# Patient Record
Sex: FEMALE | Race: OTHER | HISPANIC OR LATINO
[De-identification: names, ages, dates, MRNs, and addresses within clinical notes are randomized per-mention and may not be internally consistent; named-entity substitution may affect disease eponyms.]

---

## 2018-07-05 ENCOUNTER — APPOINTMENT (OUTPATIENT)
Dept: NEUROLOGY | Facility: CLINIC | Age: 43
End: 2018-07-05
Payer: COMMERCIAL

## 2018-07-05 VITALS
HEIGHT: 61 IN | HEART RATE: 66 BPM | SYSTOLIC BLOOD PRESSURE: 124 MMHG | WEIGHT: 138 LBS | BODY MASS INDEX: 26.06 KG/M2 | DIASTOLIC BLOOD PRESSURE: 70 MMHG

## 2018-07-05 DIAGNOSIS — M54.2 CERVICALGIA: ICD-10-CM

## 2018-07-05 DIAGNOSIS — Z82.49 FAMILY HISTORY OF ISCHEMIC HEART DISEASE AND OTHER DISEASES OF THE CIRCULATORY SYSTEM: ICD-10-CM

## 2018-07-05 DIAGNOSIS — R20.2 PARESTHESIA OF SKIN: ICD-10-CM

## 2018-07-05 DIAGNOSIS — R51 HEADACHE: ICD-10-CM

## 2018-07-05 PROCEDURE — 99204 OFFICE O/P NEW MOD 45 MIN: CPT

## 2020-09-03 ENCOUNTER — APPOINTMENT (OUTPATIENT)
Dept: OBGYN | Facility: CLINIC | Age: 45
End: 2020-09-03
Payer: COMMERCIAL

## 2020-09-03 VITALS
SYSTOLIC BLOOD PRESSURE: 147 MMHG | BODY MASS INDEX: 27.94 KG/M2 | DIASTOLIC BLOOD PRESSURE: 81 MMHG | WEIGHT: 148 LBS | HEIGHT: 61 IN

## 2020-09-03 DIAGNOSIS — N92.6 IRREGULAR MENSTRUATION, UNSPECIFIED: ICD-10-CM

## 2020-09-03 PROCEDURE — 99386 PREV VISIT NEW AGE 40-64: CPT

## 2020-09-03 PROCEDURE — 99213 OFFICE O/P EST LOW 20 MIN: CPT | Mod: 25

## 2020-09-03 NOTE — HISTORY OF PRESENT ILLNESS
[1 Year Ago] : 1 year ago [Good] : being in good health [Healthy Diet] : a healthy diet [Regular Exercise] : regular exercise [Weight Concerns] : no concerns with her weight [Last Pap ___] : Last cervical pap smear was [unfilled] [Reproductive Age] : is of reproductive age [Menstrual Problems] : reports abnormal menses [Excessive Bleeding] : there was excessive bleeding [Irregular Duration] : has been irregular [Contraception] : does not use contraception [Pregnancy History] : denies prior pregnancies [Prolonged Menses] : prolonged menses [Localized Pain] : localized breast pain [Fibroids] : fibroids [Lt Axilla] : left axilla [Normal Amount/Duration] : was of a normal amount and duration [Spotting Between  Menses] : no spotting between menses [Regular Cycle Intervals] : periods have been regular [Prior Menses:  ___ Date] : date of prior menstruation was [unfilled]

## 2020-09-08 LAB — HPV HIGH+LOW RISK DNA PNL CVX: NOT DETECTED

## 2020-09-10 LAB — CYTOLOGY CVX/VAG DOC THIN PREP: NORMAL

## 2020-09-11 ENCOUNTER — RESULT REVIEW (OUTPATIENT)
Age: 45
End: 2020-09-11

## 2020-09-11 ENCOUNTER — APPOINTMENT (OUTPATIENT)
Dept: ULTRASOUND IMAGING | Facility: CLINIC | Age: 45
End: 2020-09-11
Payer: COMMERCIAL

## 2020-09-11 PROCEDURE — 76830 TRANSVAGINAL US NON-OB: CPT

## 2020-10-05 ENCOUNTER — APPOINTMENT (OUTPATIENT)
Dept: ULTRASOUND IMAGING | Facility: CLINIC | Age: 45
End: 2020-10-05

## 2020-10-05 ENCOUNTER — APPOINTMENT (OUTPATIENT)
Dept: MAMMOGRAPHY | Facility: CLINIC | Age: 45
End: 2020-10-05

## 2020-10-13 ENCOUNTER — APPOINTMENT (OUTPATIENT)
Dept: MAMMOGRAPHY | Facility: HOSPITAL | Age: 45
End: 2020-10-13

## 2020-10-13 ENCOUNTER — APPOINTMENT (OUTPATIENT)
Dept: ULTRASOUND IMAGING | Facility: HOSPITAL | Age: 45
End: 2020-10-13

## 2020-10-14 ENCOUNTER — APPOINTMENT (OUTPATIENT)
Dept: SURGERY | Facility: CLINIC | Age: 45
End: 2020-10-14
Payer: COMMERCIAL

## 2020-10-22 ENCOUNTER — APPOINTMENT (OUTPATIENT)
Dept: SURGERY | Facility: CLINIC | Age: 45
End: 2020-10-22
Payer: COMMERCIAL

## 2020-10-22 VITALS
RESPIRATION RATE: 16 BRPM | WEIGHT: 150 LBS | BODY MASS INDEX: 27.6 KG/M2 | DIASTOLIC BLOOD PRESSURE: 80 MMHG | TEMPERATURE: 98 F | SYSTOLIC BLOOD PRESSURE: 147 MMHG | HEIGHT: 62 IN | OXYGEN SATURATION: 98 % | HEART RATE: 65 BPM

## 2020-10-22 DIAGNOSIS — M79.89 OTHER SPECIFIED SOFT TISSUE DISORDERS: ICD-10-CM

## 2020-10-22 DIAGNOSIS — Z90.49 ACQUIRED ABSENCE OF OTHER SPECIFIED PARTS OF DIGESTIVE TRACT: ICD-10-CM

## 2020-10-22 PROCEDURE — 99244 OFF/OP CNSLTJ NEW/EST MOD 40: CPT

## 2020-10-22 RX ORDER — METOPROLOL SUCCINATE 50 MG/1
50 TABLET, EXTENDED RELEASE ORAL
Refills: 0 | Status: ACTIVE | COMMUNITY

## 2020-10-22 RX ORDER — FEXOFENADINE HYDROCHLORIDE 180 MG/1
180 TABLET, FILM COATED ORAL DAILY
Refills: 0 | Status: DISCONTINUED | COMMUNITY
Start: 2018-07-05 | End: 2020-10-22

## 2020-10-22 NOTE — ASSESSMENT
[FreeTextEntry1] : I told the patient that she has some excess fat tissue in this area which does not feel like a lipoma the discomfort she feels may be that there is the tail of the breast up is in this area and she may have some mild chronic cystic mastopathy.  I have asked her to stop drinking any excess amount of caffeinated beverages or chocolate.\par \par I have put her on meloxicam for 2 weeks and she will call me to see if there is been any change.

## 2020-10-22 NOTE — CONSULT LETTER
[Dear  ___] : Dear  [unfilled], [Consult Letter:] : I had the pleasure of evaluating your patient, [unfilled]. [Please see my note below.] : Please see my note below. [Consult Closing:] : Thank you very much for allowing me to participate in the care of this patient.  If you have any questions, please do not hesitate to contact me. [Sincerely,] : Sincerely, [FreeTextEntry3] : I have reviewed all the documentation for this encounter with the patient and have edited where appropriate\par \par Dr. Alber Springer

## 2020-10-22 NOTE — HISTORY OF PRESENT ILLNESS
[de-identified] : Yoselin is a 46 y/o female here for consultation visit. The patient reports feeling "swelling" in the left axilla for the past 2 months. This has not been increasing in size. She denies severe pain. Recently noted some discomfort. She notes a decrease in swelling when drinking increased fluids. She has a hx of kidney stones.  The patient had a mammogram which was normal and axillary ultrasounds along with ultrasounds of the breast.  The ultrasound showed normal axillary lymph nodes bilaterally.  I have reviewed the images of the ultrasound and the mammogram.

## 2020-10-22 NOTE — PHYSICAL EXAM
[Normal Thyroid] : the thyroid was normal [Normal Breath Sounds] : Normal breath sounds [Normal Heart Sounds] : normal heart sounds [Normal Rate and Rhythm] : normal rate and rhythm [No Rash or Lesion] : No rash or lesion [Alert] : alert [Oriented to Person] : oriented to person [Oriented to Place] : oriented to place [Oriented to Time] : oriented to time [Anxious] : anxious [JVD] : no jugular venous distention  [Abdominal Masses] : No abdominal masses [Abdomen Tenderness] : ~T ~M No abdominal tenderness [de-identified] : Appears well nourished, in no acute distress [de-identified] : WNL [de-identified] : Lamination of both axilla failed to reveal any adenopathy.  With the patient's arms extended overhead there is definitely some swelling along the anterior axillary line on the left side compared to the right this area is soft and without any definite palpable abnormality. [de-identified] : WNL

## 2021-04-12 ENCOUNTER — EMERGENCY (EMERGENCY)
Facility: HOSPITAL | Age: 46
LOS: 1 days | Discharge: HOME | End: 2021-04-12
Attending: STUDENT IN AN ORGANIZED HEALTH CARE EDUCATION/TRAINING PROGRAM | Admitting: STUDENT IN AN ORGANIZED HEALTH CARE EDUCATION/TRAINING PROGRAM
Payer: COMMERCIAL

## 2021-04-12 VITALS
DIASTOLIC BLOOD PRESSURE: 70 MMHG | TEMPERATURE: 98 F | OXYGEN SATURATION: 98 % | SYSTOLIC BLOOD PRESSURE: 125 MMHG | RESPIRATION RATE: 16 BRPM | HEART RATE: 69 BPM

## 2021-04-12 VITALS
DIASTOLIC BLOOD PRESSURE: 51 MMHG | TEMPERATURE: 98 F | SYSTOLIC BLOOD PRESSURE: 104 MMHG | HEART RATE: 66 BPM | RESPIRATION RATE: 15 BRPM | OXYGEN SATURATION: 99 %

## 2021-04-12 DIAGNOSIS — R10.9 UNSPECIFIED ABDOMINAL PAIN: ICD-10-CM

## 2021-04-12 LAB
ALBUMIN SERPL ELPH-MCNC: 3.9 G/DL — SIGNIFICANT CHANGE UP (ref 3.3–5)
ALP SERPL-CCNC: 77 U/L — SIGNIFICANT CHANGE UP (ref 40–120)
ALT FLD-CCNC: 57 U/L — HIGH (ref 4–33)
ANION GAP SERPL CALC-SCNC: 11 MMOL/L — SIGNIFICANT CHANGE UP (ref 7–14)
APPEARANCE UR: ABNORMAL
AST SERPL-CCNC: 30 U/L — SIGNIFICANT CHANGE UP (ref 4–32)
BACTERIA # UR AUTO: ABNORMAL
BASOPHILS # BLD AUTO: 0.05 K/UL — SIGNIFICANT CHANGE UP (ref 0–0.2)
BASOPHILS NFR BLD AUTO: 0.6 % — SIGNIFICANT CHANGE UP (ref 0–2)
BILIRUB SERPL-MCNC: 0.3 MG/DL — SIGNIFICANT CHANGE UP (ref 0.2–1.2)
BILIRUB UR-MCNC: NEGATIVE — SIGNIFICANT CHANGE UP
BUN SERPL-MCNC: 11 MG/DL — SIGNIFICANT CHANGE UP (ref 7–23)
CALCIUM SERPL-MCNC: 8.8 MG/DL — SIGNIFICANT CHANGE UP (ref 8.4–10.5)
CHLORIDE SERPL-SCNC: 103 MMOL/L — SIGNIFICANT CHANGE UP (ref 98–107)
CO2 SERPL-SCNC: 22 MMOL/L — SIGNIFICANT CHANGE UP (ref 22–31)
COLOR SPEC: YELLOW — SIGNIFICANT CHANGE UP
CREAT SERPL-MCNC: 0.74 MG/DL — SIGNIFICANT CHANGE UP (ref 0.5–1.3)
DIFF PNL FLD: NEGATIVE — SIGNIFICANT CHANGE UP
EOSINOPHIL # BLD AUTO: 0.53 K/UL — HIGH (ref 0–0.5)
EOSINOPHIL NFR BLD AUTO: 6.4 % — HIGH (ref 0–6)
EPI CELLS # UR: 12 /HPF — HIGH (ref 0–5)
GLUCOSE SERPL-MCNC: 103 MG/DL — HIGH (ref 70–99)
GLUCOSE UR QL: NEGATIVE — SIGNIFICANT CHANGE UP
HCG SERPL-ACNC: <5 MIU/ML — SIGNIFICANT CHANGE UP
HCT VFR BLD CALC: 37.6 % — SIGNIFICANT CHANGE UP (ref 34.5–45)
HGB BLD-MCNC: 12.5 G/DL — SIGNIFICANT CHANGE UP (ref 11.5–15.5)
HYALINE CASTS # UR AUTO: 1 /LPF — SIGNIFICANT CHANGE UP (ref 0–7)
IANC: 4.33 K/UL — SIGNIFICANT CHANGE UP (ref 1.5–8.5)
IMM GRANULOCYTES NFR BLD AUTO: 0.4 % — SIGNIFICANT CHANGE UP (ref 0–1.5)
KETONES UR-MCNC: NEGATIVE — SIGNIFICANT CHANGE UP
LEUKOCYTE ESTERASE UR-ACNC: NEGATIVE — SIGNIFICANT CHANGE UP
LYMPHOCYTES # BLD AUTO: 2.71 K/UL — SIGNIFICANT CHANGE UP (ref 1–3.3)
LYMPHOCYTES # BLD AUTO: 32.8 % — SIGNIFICANT CHANGE UP (ref 13–44)
MCHC RBC-ENTMCNC: 29.7 PG — SIGNIFICANT CHANGE UP (ref 27–34)
MCHC RBC-ENTMCNC: 33.2 GM/DL — SIGNIFICANT CHANGE UP (ref 32–36)
MCV RBC AUTO: 89.3 FL — SIGNIFICANT CHANGE UP (ref 80–100)
MONOCYTES # BLD AUTO: 0.61 K/UL — SIGNIFICANT CHANGE UP (ref 0–0.9)
MONOCYTES NFR BLD AUTO: 7.4 % — SIGNIFICANT CHANGE UP (ref 2–14)
NEUTROPHILS # BLD AUTO: 4.33 K/UL — SIGNIFICANT CHANGE UP (ref 1.8–7.4)
NEUTROPHILS NFR BLD AUTO: 52.4 % — SIGNIFICANT CHANGE UP (ref 43–77)
NITRITE UR-MCNC: NEGATIVE — SIGNIFICANT CHANGE UP
NRBC # BLD: 0 /100 WBCS — SIGNIFICANT CHANGE UP
NRBC # FLD: 0 K/UL — SIGNIFICANT CHANGE UP
PH UR: 6 — SIGNIFICANT CHANGE UP (ref 5–8)
PLATELET # BLD AUTO: 169 K/UL — SIGNIFICANT CHANGE UP (ref 150–400)
POTASSIUM SERPL-MCNC: 3.8 MMOL/L — SIGNIFICANT CHANGE UP (ref 3.5–5.3)
POTASSIUM SERPL-SCNC: 3.8 MMOL/L — SIGNIFICANT CHANGE UP (ref 3.5–5.3)
PROT SERPL-MCNC: 7 G/DL — SIGNIFICANT CHANGE UP (ref 6–8.3)
PROT UR-MCNC: ABNORMAL
RBC # BLD: 4.21 M/UL — SIGNIFICANT CHANGE UP (ref 3.8–5.2)
RBC # FLD: 12.2 % — SIGNIFICANT CHANGE UP (ref 10.3–14.5)
RBC CASTS # UR COMP ASSIST: 4 /HPF — SIGNIFICANT CHANGE UP (ref 0–4)
SARS-COV-2 RNA SPEC QL NAA+PROBE: SIGNIFICANT CHANGE UP
SODIUM SERPL-SCNC: 136 MMOL/L — SIGNIFICANT CHANGE UP (ref 135–145)
SP GR SPEC: 1.03 — HIGH (ref 1.01–1.02)
UROBILINOGEN FLD QL: SIGNIFICANT CHANGE UP
WBC # BLD: 8.26 K/UL — SIGNIFICANT CHANGE UP (ref 3.8–10.5)
WBC # FLD AUTO: 8.26 K/UL — SIGNIFICANT CHANGE UP (ref 3.8–10.5)
WBC UR QL: 2 /HPF — SIGNIFICANT CHANGE UP (ref 0–5)

## 2021-04-12 PROCEDURE — 76830 TRANSVAGINAL US NON-OB: CPT | Mod: 26

## 2021-04-12 PROCEDURE — 99285 EMERGENCY DEPT VISIT HI MDM: CPT

## 2021-04-12 PROCEDURE — 76770 US EXAM ABDO BACK WALL COMP: CPT | Mod: 26

## 2021-04-12 PROCEDURE — 74176 CT ABD & PELVIS W/O CONTRAST: CPT | Mod: 26

## 2021-04-12 RX ORDER — KETOROLAC TROMETHAMINE 30 MG/ML
15 SYRINGE (ML) INJECTION ONCE
Refills: 0 | Status: DISCONTINUED | OUTPATIENT
Start: 2021-04-12 | End: 2021-04-12

## 2021-04-12 RX ORDER — IBUPROFEN 200 MG
1 TABLET ORAL
Qty: 21 | Refills: 0
Start: 2021-04-12 | End: 2021-04-18

## 2021-04-12 RX ORDER — ONDANSETRON 8 MG/1
4 TABLET, FILM COATED ORAL ONCE
Refills: 0 | Status: COMPLETED | OUTPATIENT
Start: 2021-04-12 | End: 2021-04-12

## 2021-04-12 RX ORDER — LIDOCAINE 4 G/100G
1 CREAM TOPICAL
Qty: 30 | Refills: 0
Start: 2021-04-12 | End: 2021-04-18

## 2021-04-12 RX ORDER — SODIUM CHLORIDE 9 MG/ML
1000 INJECTION INTRAMUSCULAR; INTRAVENOUS; SUBCUTANEOUS ONCE
Refills: 0 | Status: COMPLETED | OUTPATIENT
Start: 2021-04-12 | End: 2021-04-12

## 2021-04-12 RX ORDER — KETOROLAC TROMETHAMINE 30 MG/ML
30 SYRINGE (ML) INJECTION ONCE
Refills: 0 | Status: DISCONTINUED | OUTPATIENT
Start: 2021-04-12 | End: 2021-04-12

## 2021-04-12 RX ADMIN — Medication 15 MILLIGRAM(S): at 06:28

## 2021-04-12 RX ADMIN — SODIUM CHLORIDE 1000 MILLILITER(S): 9 INJECTION INTRAMUSCULAR; INTRAVENOUS; SUBCUTANEOUS at 06:28

## 2021-04-12 RX ADMIN — ONDANSETRON 4 MILLIGRAM(S): 8 TABLET, FILM COATED ORAL at 06:28

## 2021-04-12 RX ADMIN — Medication 30 MILLIGRAM(S): at 11:34

## 2021-04-12 NOTE — CONSULT NOTE ADULT - ASSESSMENT
44yo  w/ PMH nephrolithiasis and HTN evaluated for L sided flank pain x4 days. Physical exam significant for left lower back tenderness, but no CVA tenderness, and gyn exam benign. Imaging significant for right hydrosalpinx, but no left sided abnormalities. Patient stable.

## 2021-04-12 NOTE — CONSULT NOTE ADULT - SUBJECTIVE AND OBJECTIVE BOX
AARON BERG  45y  Female [id number]    HPI: 44yo w/ PMH nephrolithiasis presents w/ L sided flank pain x1 week. She notes that the pain is intermittent, does not radiate to the left side, and is rated 7/10 in severity. She endorses nausea, but no vomiting. Denies fevers, chills, chest pain, SOB, abdominal pain, vaginal discharge, vaginal bleeding, dysuria, frequency, urgency, or bowel complaints.     Name of GYN Physician:     POB:     Pgyn: Denies fibroids, cysts, endometriosis, STI's, Abnormal pap smears     Home meds:     Hospital Meds:   MEDICATIONS  (STANDING):    MEDICATIONS  (PRN):      Allergies    No Known Allergies    Intolerances        PAST MEDICAL & SURGICAL HISTORY:  No pertinent past medical history    No significant past surgical history        FAMILY HISTORY:  No pertinent family history in first degree relatives        Social History:  Denies smoking use, drug use, alcohol use.   +occasional social alcohol use    Vital Signs Last 24 Hrs  T(C): 36.7 (2021 11:46), Max: 36.8 (2021 05:08)  T(F): 98.1 (2021 11:46), Max: 98.2 (2021 05:08)  HR: 66 (2021 11:46) (66 - 74)  BP: 104/51 (2021 11:46) (104/51 - 131/78)  BP(mean): --  RR: 15 (2021 11:46) (15 - 17)  SpO2: 99% (2021 11:46) (98% - 99%)    Physical Exam:   General: sitting comftorably in bed, NAD   HEENT: neck supple, full ROM  CV: RR S1S2 no m/r/g  Lungs: CTA b/l, good air flow b/l   Back: No CVA tenderness  Abd: Soft, non-tender, non-distended.  Bowel sounds present.    :  No bleeding on pad.    External labia wnl.  Bimanual exam with no cervical motion tenderness, uterus wnl, adnexa non palpable b/l.  Cervix closed vs. Cervix dilated    cm   Speculum Exam: No active bleeding from os.  Physiologic discharge.    Ext: non-tender b/l, no edema     LABS:                              12.5   8.26  )-----------( 169      ( 2021 06:38 )             37.6     04-12    136  |  103  |  11  ----------------------------<  103<H>  3.8   |  22  |  0.74    Ca    8.8      2021 06:38    TPro  7.0  /  Alb  3.9  /  TBili  0.3  /  DBili  x   /  AST  30  /  ALT  57<H>  /  AlkPhos  77  04-12    I&O's Detail      Urinalysis Basic - ( 2021 06:38 )    Color: Yellow / Appearance: Slightly Turbid / S.033 / pH: x  Gluc: x / Ketone: Negative  / Bili: Negative / Urobili: <2 mg/dL   Blood: x / Protein: 30 mg/dL / Nitrite: Negative   Leuk Esterase: Negative / RBC: 4 /HPF / WBC 2 /HPF   Sq Epi: x / Non Sq Epi: 12 /HPF / Bacteria: Few        RADIOLOGY & ADDITIONAL STUDIES: AARON BERG  45y  Female [id number]    HPI: 46yo  w/ PMH nephrolithiasis and HTN presents w/ L sided flank pain x4 days. She notes that the pain is intermittent, does not radiate to the left side, and is rated 7/10 in severity. She took Aleve at home, which provided temporary relief. She saw her PCP at the onset of symptoms, and was given flomax and cipro, but states that her symptoms have worsened despite these meds. She endorses nausea, but no vomiting. Endorses slightly dark urine recently, but denies hematuria, malodor, urinary urgency, urinary frequency, or incontinence. Denies fevers, chills, chest pain, SOB, abdominal pain, vaginal discharge, vaginal bleeding, or bowel complaints.     Name of GYN Physician: sees a provider in [place], but patient could not recall the name or office location of the provider    POB: ETOP s/p D&C in     Pgyn: Denies fibroids, cysts, endometriosis, STI's, Abnormal pap smears       Allergies  No Known Allergies    MEDICAL & SURGICAL HISTORY:  HTN   Nephrolithiasis  s/p D&C    FAMILY HISTORY:  No pertinent family history in first degree relatives    Social History:  Denies smoking use, drug use, alcohol use.   +occasional social alcohol use    Vital Signs Last 24 Hrs  T(C): 36.7 (2021 11:46), Max: 36.8 (2021 05:08)  T(F): 98.1 (2021 11:46), Max: 98.2 (2021 05:08)  HR: 66 (2021 11:46) (66 - 74)  BP: 104/51 (2021 11:46) (104/51 - 131/78)  BP(mean): --  RR: 15 (2021 11:46) (15 - 17)  SpO2: 99% (2021 11:46) (98% - 99%)    Physical Exam:   General: sitting comfortably in bed, NAD   CV: RR S1S2 no m/r/g  Lungs: CTA b/l, good air flow b/l   Back: tender to palpation along left lower back at the level of the sacrum, No CVA tenderness bilaterally  Abd: Soft, non-tender, non-distended.  Bowel sounds present. No rebound or guarding.  :  No bleeding on pad.  External labia wnl.  Bimanual exam with no cervical motion tenderness, uterus wnl, adnexa non palpable b/l.  Cervix closed  Speculum Exam: No active bleeding from os.  Physiologic discharge.    Ext: non-tender b/l, no edema     LABS:               12.5   8.26  )-----------( 169      ( 2021 06:38 )             37.6         136  |  103  |  11  ----------------------------<  103<H>  3.8   |  22  |  0.74    Ca    8.8      2021 06:38    TPro  7.0  /  Alb  3.9  /  TBili  0.3  /  DBili  x   /  AST  30  /  ALT  57<H>  /  AlkPhos  77      I&O's Detail    Urinalysis Basic - ( 2021 06:38 )    Color: Yellow / Appearance: Slightly Turbid / S.033 / pH: x  Gluc: x / Ketone: Negative  / Bili: Negative / Urobili: <2 mg/dL   Blood: x / Protein: 30 mg/dL / Nitrite: Negative   Leuk Esterase: Negative / RBC: 4 /HPF / WBC 2 /HPF   Sq Epi: x / Non Sq Epi: 12 /HPF / Bacteria: Few    RADIOLOGY & ADDITIONAL STUDIES:    TVUS FINDINGS (21):  Uterus: 10.6 x 6.3 x 6.6 cm. Multiple fibroids, the largest is posterior intramural fibroid measuring 5.9 x 5.1 x 4.8 cm, not significantly changed.  Endometrium: 8 mm. Mild cystic changes.  Right ovary: Not well visualized. Two cystic structures in the right adnexa measuring 2.0 x 2.0 x 2.1 cm and a more tubular structure measuring 5.5 x 2.1 x 2.0 cm.  Left ovary: 1.8 x 1.4 x 1.4 cm. Within normal limits.  Fluid: None.    IMPRESSION:  Right ovary not well visualized. Two cystic structures in the right adnexa, likely related to hydrosalpinx/ paraovarian cysts.  Prominent endometrium with cystic change. An underlying polyp cannot be excluded. If clinically warranted, a sonohysterogram can be performed for further evaluation.  Fibroid uterus.

## 2021-04-12 NOTE — ED ADULT NURSE REASSESSMENT NOTE - NS ED NURSE REASSESS COMMENT FT1
Pt awaiting ultrasound; resting comfortably at this time.
Pt states pain is coming back 6/10 after returning from u/s.  LUIZ galloway; awaiting MD orders.
Pt reports pain diminished 3-4/10 and tolerable. Awaiting further Md orders.

## 2021-04-12 NOTE — ED PROVIDER NOTE - PROGRESS NOTE DETAILS
Ad: Received sign out from resident. PT sleeping comfortably before assessment. Symptoms improved with medications. Pending labs and CT. Ad: CT shows 6cm cystic structrure on the R - will get tVUS to evaluate. Pt still comfortable Ad: US shows cystic structures cannot exclude hydrosalpinx on the R. on reexamination after 2nd dose of toradol patient still has CVA tenderness b/l with neg UA and CT abd/pel. GYN paged for consult. Ad: GYN called - will discuss case with seniors. Ad: GYN ealuated. States the CVA tenderness does not apepar to be GYN in origin. Will dc with pain medication and pmd followup

## 2021-04-12 NOTE — ED PROVIDER NOTE - OBJECTIVE STATEMENT
45 female, Hx: HTN, Kidney Stone - presents with L sided flank pain (atraumatic in presentation) that began on Friday. Pt reports pain is localized to L flank radiating down, with associated nausea. Denies any fevers, chills, cough, CP, SOB, abdominal pain, vomiting, constipation, bloody stools, dysuric symptoms. Reports she saw her PMD - had a UA (does not know results) and was empirically started on Cipro and Flomax. Now with worsening flank pain. Reports pain is exact same in presentation as her previous stones. Denies pregnancy.

## 2021-04-12 NOTE — ED ADULT NURSE NOTE - CHIEF COMPLAINT QUOTE
Patient having left flank pain since friday. patient was prescribed flomax and cipro 500. patient continues to have some pain to left flank

## 2021-04-12 NOTE — CONSULT NOTE ADULT - PROBLEM SELECTOR RECOMMENDATION 9
Plan  - Clinical presentation (left flank pain) inconsistent with imaging (right hydrosalpinx), GYN etiology unlikely   - Patient afebrile, WBC wnl  - Patient hemodynamically stable     Patient H&P presented to Dr. Goodson, attending physician. Attempted to re-evaluate patient with Dr. Goodson at bedside, however, upon arrival to the ED, nurse informed that patient had been discharged, as her pain had improved.     D/w Dr. Hamzah Yang, PGY1

## 2021-04-12 NOTE — ED ADULT NURSE NOTE - OBJECTIVE STATEMENT
Patient received in room 27, A&OX4, ambulatory. Patient reports to ED complaining of left flank pain/nausea since Friday. Patient reports seeing PCP and being prescribed Cipro and Flomax without relief. Patient denies urinary symptoms, fevers, chills, vomiting, diarrhea. 20g placed in right ac, labs collected and sent. urine collected and sent. Patient in no acute distress, respirations even and unlabored. Will continue to monitor.

## 2021-04-12 NOTE — ED PROVIDER NOTE - PATIENT PORTAL LINK FT
You can access the FollowMyHealth Patient Portal offered by [facility] by registering at the following website: [email or web address]. By joining [employer]’s FollowMyHealth portal, you will also be able to view your health information using other applications (apps) compatible with our system.

## 2021-04-12 NOTE — ED PROVIDER NOTE - PHYSICAL EXAMINATION
GEN - NAD; non-toxic; A+Ox3, speaking full sentences, steady gait   HENT - NC/AT, No visible Ecchymosis, No Abrasions, No Lac/Tears, MMM, no discharge  EYES - EOMI, no conjunctival pallor, no scleral icterus  PULM - CTA B/L,  symmetric breath sounds  CV -  RRR, S1 S2, no murmurs 2+ Pulses B/L UE  GI - NT/ND, soft, no guarding, no rebound, no masses    MSK/EXT- (+) L CVA tenderness; no edema, no gross deformity, warm and well perfused, no calf tenderness/swelling/erythema   SKIN - no rash or bruising  NEUROLOGIC - alert, moving all 4 ext with 5/5 Strength

## 2021-04-12 NOTE — ED PROVIDER NOTE - NS ED ROS FT
Constitution: No Fever or chills, No Weight Loss,   Eyes: No visual changes  HEENT: No cough, No Discharge, No Rhinorrhea, No URI symptoms  Cardio: No Chest pain, No Palpitations, No Dyspnea  Resp: No SOB, No Wheezing  GI: No abdominal pain, (+) Nausea, No Vomiting, No Constipation, No Diarrhea  : (+) L Flank Pain; No burning upon urination, trouble urinating, no foul odor from urine  MSK: No Numbness, No Tingling, No Weakness  Neuro: No Headache, Normal Gait  Skin: No rashes, No Bruising, No Swelling

## 2021-04-12 NOTE — ED PROVIDER NOTE - NSFOLLOWUPINSTRUCTIONS_ED_ALL_ED_FT
Take Ibuprofen 600mg every 8 hours as needed for pain.   Use lidocaine patch for up to 12 hours as needed for pain.   Follow up with your primary care if symptomatic persist.     SEEK IMMEDIATE MEDICAL CARE IF YOU HAVE ANY OF THE FOLLOWING SYMPTOMS: severe back or abdominal pain, fever, inability to keep fluids or medicine down, dizziness/lightheadedness, or a change in mental status.

## 2021-04-12 NOTE — ED PROVIDER NOTE - CLINICAL SUMMARY MEDICAL DECISION MAKING FREE TEXT BOX
45 female, Hx: HTN, Kidney Stone - presents with L sided flank pain (atraumatic in presentation) that began on Friday. with associated nausea. Saw PMD - had a UA (does not know results); empirically started on Cipro and Flomax. Exam, presentation, and history concerning for renal colic vs. pyelo; evaluation is not consistent with appendicitis, cholecystitis, pancreatitis, diverticulitis, AAA. Plan: CBC, CMP, UA, UCx, HCG, Fluids, Zofran, Toradol, Re-eval. Renal Sono. VSS, non-toxic.

## 2021-04-13 LAB
CULTURE RESULTS: SIGNIFICANT CHANGE UP
SPECIMEN SOURCE: SIGNIFICANT CHANGE UP

## 2021-08-10 NOTE — ED ADULT NURSE NOTE - CAS DISCH TRANSFER METHOD
You can access the FollowMyHealth Patient Portal offered by [facility] by registering at the following website: [email or web address]. By joining [employer]’s FollowMyHealth portal, you will also be able to view your health information using other applications (apps) compatible with our system.
Private car

## 2021-10-01 ENCOUNTER — EMERGENCY (EMERGENCY)
Facility: HOSPITAL | Age: 46
LOS: 1 days | Discharge: HOME | End: 2021-10-01
Attending: STUDENT IN AN ORGANIZED HEALTH CARE EDUCATION/TRAINING PROGRAM | Admitting: STUDENT IN AN ORGANIZED HEALTH CARE EDUCATION/TRAINING PROGRAM
Payer: COMMERCIAL

## 2021-10-01 VITALS
RESPIRATION RATE: 16 BRPM | DIASTOLIC BLOOD PRESSURE: 81 MMHG | OXYGEN SATURATION: 100 % | HEART RATE: 68 BPM | SYSTOLIC BLOOD PRESSURE: 123 MMHG

## 2021-10-01 VITALS
RESPIRATION RATE: 16 BRPM | DIASTOLIC BLOOD PRESSURE: 70 MMHG | SYSTOLIC BLOOD PRESSURE: 123 MMHG | HEART RATE: 64 BPM | OXYGEN SATURATION: 100 % | TEMPERATURE: 99 F

## 2021-10-01 LAB
ALBUMIN SERPL ELPH-MCNC: 4.1 G/DL — SIGNIFICANT CHANGE UP (ref 3.3–5)
ALP SERPL-CCNC: 76 U/L — SIGNIFICANT CHANGE UP (ref 40–120)
ALT FLD-CCNC: 48 U/L — HIGH (ref 4–33)
ANION GAP SERPL CALC-SCNC: 9 MMOL/L — SIGNIFICANT CHANGE UP (ref 7–14)
APPEARANCE UR: CLEAR — SIGNIFICANT CHANGE UP
AST SERPL-CCNC: 27 U/L — SIGNIFICANT CHANGE UP (ref 4–32)
BASOPHILS # BLD AUTO: 0.07 K/UL — SIGNIFICANT CHANGE UP (ref 0–0.2)
BASOPHILS NFR BLD AUTO: 0.5 % — SIGNIFICANT CHANGE UP (ref 0–2)
BILIRUB SERPL-MCNC: 0.3 MG/DL — SIGNIFICANT CHANGE UP (ref 0.2–1.2)
BILIRUB UR-MCNC: NEGATIVE — SIGNIFICANT CHANGE UP
BLOOD GAS VENOUS COMPREHENSIVE RESULT: SIGNIFICANT CHANGE UP
BUN SERPL-MCNC: 18 MG/DL — SIGNIFICANT CHANGE UP (ref 7–23)
CALCIUM SERPL-MCNC: 9.1 MG/DL — SIGNIFICANT CHANGE UP (ref 8.4–10.5)
CHLORIDE SERPL-SCNC: 102 MMOL/L — SIGNIFICANT CHANGE UP (ref 98–107)
CO2 SERPL-SCNC: 26 MMOL/L — SIGNIFICANT CHANGE UP (ref 22–31)
COLOR SPEC: YELLOW — SIGNIFICANT CHANGE UP
CREAT SERPL-MCNC: 0.82 MG/DL — SIGNIFICANT CHANGE UP (ref 0.5–1.3)
DIFF PNL FLD: NEGATIVE — SIGNIFICANT CHANGE UP
EOSINOPHIL # BLD AUTO: 0.57 K/UL — HIGH (ref 0–0.5)
EOSINOPHIL NFR BLD AUTO: 4.3 % — SIGNIFICANT CHANGE UP (ref 0–6)
GLUCOSE SERPL-MCNC: 92 MG/DL — SIGNIFICANT CHANGE UP (ref 70–99)
GLUCOSE UR QL: NEGATIVE — SIGNIFICANT CHANGE UP
HCT VFR BLD CALC: 38.6 % — SIGNIFICANT CHANGE UP (ref 34.5–45)
HGB BLD-MCNC: 13 G/DL — SIGNIFICANT CHANGE UP (ref 11.5–15.5)
IANC: 8.25 K/UL — SIGNIFICANT CHANGE UP (ref 1.5–8.5)
IMM GRANULOCYTES NFR BLD AUTO: 0.3 % — SIGNIFICANT CHANGE UP (ref 0–1.5)
KETONES UR-MCNC: NEGATIVE — SIGNIFICANT CHANGE UP
LEUKOCYTE ESTERASE UR-ACNC: NEGATIVE — SIGNIFICANT CHANGE UP
LYMPHOCYTES # BLD AUTO: 26.5 % — SIGNIFICANT CHANGE UP (ref 13–44)
LYMPHOCYTES # BLD AUTO: 3.5 K/UL — HIGH (ref 1–3.3)
MCHC RBC-ENTMCNC: 29.5 PG — SIGNIFICANT CHANGE UP (ref 27–34)
MCHC RBC-ENTMCNC: 33.7 GM/DL — SIGNIFICANT CHANGE UP (ref 32–36)
MCV RBC AUTO: 87.7 FL — SIGNIFICANT CHANGE UP (ref 80–100)
MONOCYTES # BLD AUTO: 0.78 K/UL — SIGNIFICANT CHANGE UP (ref 0–0.9)
MONOCYTES NFR BLD AUTO: 5.9 % — SIGNIFICANT CHANGE UP (ref 2–14)
NEUTROPHILS # BLD AUTO: 8.25 K/UL — HIGH (ref 1.8–7.4)
NEUTROPHILS NFR BLD AUTO: 62.5 % — SIGNIFICANT CHANGE UP (ref 43–77)
NITRITE UR-MCNC: NEGATIVE — SIGNIFICANT CHANGE UP
NRBC # BLD: 0 /100 WBCS — SIGNIFICANT CHANGE UP
NRBC # FLD: 0 K/UL — SIGNIFICANT CHANGE UP
PH UR: 7 — SIGNIFICANT CHANGE UP (ref 5–8)
PLATELET # BLD AUTO: 194 K/UL — SIGNIFICANT CHANGE UP (ref 150–400)
POTASSIUM SERPL-MCNC: 4.2 MMOL/L — SIGNIFICANT CHANGE UP (ref 3.5–5.3)
POTASSIUM SERPL-SCNC: 4.2 MMOL/L — SIGNIFICANT CHANGE UP (ref 3.5–5.3)
PROT SERPL-MCNC: 7 G/DL — SIGNIFICANT CHANGE UP (ref 6–8.3)
PROT UR-MCNC: ABNORMAL
RBC # BLD: 4.4 M/UL — SIGNIFICANT CHANGE UP (ref 3.8–5.2)
RBC # FLD: 12.7 % — SIGNIFICANT CHANGE UP (ref 10.3–14.5)
SODIUM SERPL-SCNC: 137 MMOL/L — SIGNIFICANT CHANGE UP (ref 135–145)
SP GR SPEC: 1.03 — SIGNIFICANT CHANGE UP (ref 1–1.05)
UROBILINOGEN FLD QL: SIGNIFICANT CHANGE UP
WBC # BLD: 13.21 K/UL — HIGH (ref 3.8–10.5)
WBC # FLD AUTO: 13.21 K/UL — HIGH (ref 3.8–10.5)

## 2021-10-01 PROCEDURE — 99284 EMERGENCY DEPT VISIT MOD MDM: CPT

## 2021-10-01 NOTE — ED PROVIDER NOTE - ATTENDING CONTRIBUTION TO CARE
I have personally seen and examined this patient.  I have fully participated in the care of this patient. I have reviewed all pertinent clinical information, including history, physical exam, plan and the ACP’s note and agree except as noted. - MD Tamara.    45 yo F with urge, incontinence with cough, + supra pucvic disfomfort but soft, non-tender abd. uti cystitis vs. stress incontinence, pt does not have signs of infection, no fever or chills, not likely pyelo at this time, ua, lab, likely dc if negative finding.

## 2021-10-01 NOTE — ED PROVIDER NOTE - PATIENT PORTAL LINK FT
You can access the FollowMyHealth Patient Portal offered by [facility] by registering at the following website: [email or web address]. By joining [employer]s FollowMyHealth portal, you will also be able to view your health information using other applications (apps) compatible with our system.

## 2021-10-01 NOTE — ED PROVIDER NOTE - CLINICAL SUMMARY MEDICAL DECISION MAKING FREE TEXT BOX
47 y/o F w/ PMH kidney stones and HTN presents to the ER c/o suprapubic pressure, dysuria and difficulty w/ urination that started yesterday. Pt is well appearing, in NAD. Will check labs, UA and follow up PMD. 45 y/o Female with a PMHx of kidney stones and HTN presents to the ER c/o suprapubic pressure, dysuria and difficulty w/ urination that started yesterday. Pt states 2 days ago, she noticed she had been urinating more than normal and today she reports only small amounts of urine every time she tries to urinate.  Pt is well appearing, in NAD, abdomen is soft non tender, will check labs, UA, follow up PMD, urology.

## 2021-10-01 NOTE — ED PROVIDER NOTE - OBJECTIVE STATEMENT
45 y/o F w/ PMH kidney stones and HTN presents to the ER c/o suprapubic pressure, dysuria and difficulty w/ urination that started yesterday. Pt states 2 days ago, she noticed she had been peeing more than normal and today she reports only small amounts of urine every time she tries to pee. Pt denies fever, chills, nausea, vomiting, back pain, pelvic pain, vaginal bleeding or vaginal discharge. Pt states she has not been sexually active for approximately 2 years. Nonsmoker. No PSHx. 45 y/o Female with a PMHx of kidney stones and HTN presents to the ER c/o suprapubic pressure, dysuria and difficulty w/ urination that started yesterday. Pt states 2 days ago, she noticed she had been urinating more than normal and today she reports only small amounts of urine every time she tries to urinate. Pt denies fever, chills, nausea, vomiting, back pain, pelvic pain, vaginal bleeding, vaginal discharge.  Pt states she has not been sexually active for approximately 2 years. Nonsmoker. No PSHx.

## 2021-10-01 NOTE — ED PROVIDER NOTE - PROGRESS NOTE DETAILS
Attending/MD Tamara. bedside US, resiual bladder volume 120ml, not retension. clear uine. pt is ready for go home with Urogynecologist follow up. I have given the pt strict return and follow up precautions. The patient has been provided with a copy of all pertinent results. The patient has been informed of all concerning signs and symptoms to return to Emergency Department, the necessity to follow up with PMD/Clinic/follow up provided within 2-3 days was explained, and the patient reports understanding of above with capacity and insight. LUIZ Levin: pt feels better ambulating without difficulty.  Results reviewed with patient.  Discharge reviewed and discussed with patient. LUIZ Levin; I scribed my note to Brie Leung.

## 2021-10-01 NOTE — ED PROVIDER NOTE - NSFOLLOWUPINSTRUCTIONS_ED_ALL_ED_FT
- follow up with Urogynecologist at their office    Thank you for visiting our Emergency Department, it has been a pleasure taking part in your healthcare.    You had a thorough evaluation including an exam, labs and imaging. You were given medications for comfort. Your workup did not demonstrate any concerning findings. This does not mean that your symptoms are not real, only that we were unable to find a dangerous or life-threatening cause. Please read the attached information sheets as they will provide useful information regarding your condition.    Your discharge diagnosis is: incontinence  Return precautions to the Emergency Department include but are not limited to: unrelenting nausea, vomiting, fever, chills, chest pain, shortness of breath, dizziness, chest or abdominal pain, worsening back pain, syncope, blood in urine or stool, headache that doesn't resolve, numbness or tingling, loss of sensation, loss of motor function, or any other concerning symptoms.    1) Follow up with your primary care doctor within 48 hours. Please call [phone] to make an appointment or with any questions you may have.  or call [phone] to make an appointment with the clinic  2) Take Tylenol 650-1000mg every six hours and supplement with ibuprofen 400mg, with food, every six hours which can be taken three hours apart from the Tylenol to have a layered effect. Please do not take these medications if you do no have pain or if you have any history of bleeding disorders, kidney or liver disease. Do not use ibuprofen if you are on blood thinners (anti-coagulation).  3) Drink at least 2 Liters or 64 Ounces of water each day.

## 2021-10-01 NOTE — ED PROVIDER NOTE - TEMPLATE
----- Message from Brooklynn Del Rosario sent at 3/19/2020  2:04 PM CDT -----  Contact: pt  REASON; Pt says she missed two call but does not know from who    COMMUNICATION; [phone]  
Did not try to call patient.   Attempted to return her call, no answer.  
 Symptoms

## 2021-10-01 NOTE — ED PROVIDER NOTE - NSICDXPASTMEDICALHX_GEN_ALL_CORE_FT
PAST MEDICAL HISTORY:  No pertinent past medical history       HTN (hypertension)     Kidney stones

## 2021-10-01 NOTE — ED ADULT TRIAGE NOTE - CHIEF COMPLAINT QUOTE
urinary retention (dripping urine) x 1 day, b/l flank pain x 1 month. Denies hematuria, nausea/vomiting. On amoxicillin for recent tooth extraction. Hx kidney stones, on flomax

## 2021-10-01 NOTE — ED PROVIDER NOTE - RESPIRATORY, MLM
Tommy is calling for a refill of Hydrocodone  mg tab.  Pharmacy is set up.   Breath sounds clear and equal bilaterally.

## 2021-10-01 NOTE — ED PROVIDER NOTE - CHPI ED SYMPTOMS NEG
no back pain, no vaginal bleeding, no vaginal discharge, no pelvic pain/no fever/no nausea/no vomiting/no chills no fever/no nausea/no vomiting/no chills

## 2021-10-05 NOTE — ED POST DISCHARGE NOTE - ADDITIONAL DOCUMENTATION
Tim, PGY-3: Patient informed of her urine culture results. She states her symptoms have completely resolved after taking a 7 day course of amoxicillin after a recent dental procedure. (culture shows sensitivity to amoxicillin/clavulanate acid). Given no sx and recent ax use, held off on further Rx for now. pt states she will f/u with her primary care doctor and urologist, any worsening symptoms and she will return to the ED. Tim, PGY-3 1:17pm 10/8/21: Patient informed of her urine culture results. She states her symptoms have completely resolved after taking a 7 day course of amoxicillin after a recent dental procedure. (culture shows sensitivity to amoxicillin/clavulanate acid). Given no sx and recent ax use, held off on further Rx for now. pt states she will f/u with her primary care doctor and urologist, any worsening symptoms and she will return to the ED.

## 2021-10-05 NOTE — ED POST DISCHARGE NOTE - RESULT SUMMARY
UCX : E. Coli 10,000-49,000CFU/ml . No antibiotic listed in ED provider note or prescription writer at time of discharge.. UA: negative. Patient with a Hx of Kidney stones and P/W urinary complaints. Patient contact # [phone] message left with Call Back  P.A. number and hours for return call back.

## 2021-10-07 ENCOUNTER — APPOINTMENT (OUTPATIENT)
Dept: UROLOGY | Facility: CLINIC | Age: 46
End: 2021-10-07

## 2021-10-28 PROBLEM — N20.0 CALCULUS OF KIDNEY: Chronic | Status: ACTIVE | Noted: 2021-10-01

## 2021-10-28 PROBLEM — I10 ESSENTIAL (PRIMARY) HYPERTENSION: Chronic | Status: ACTIVE | Noted: 2021-10-01

## 2021-11-04 ENCOUNTER — NON-APPOINTMENT (OUTPATIENT)
Age: 46
End: 2021-11-04

## 2021-11-04 ENCOUNTER — APPOINTMENT (OUTPATIENT)
Dept: OPHTHALMOLOGY | Facility: CLINIC | Age: 46
End: 2021-11-04
Payer: COMMERCIAL

## 2021-11-04 PROCEDURE — 92004 COMPRE OPH EXAM NEW PT 1/>: CPT

## 2021-11-09 ENCOUNTER — OUTPATIENT (OUTPATIENT)
Dept: OUTPATIENT SERVICES | Facility: HOSPITAL | Age: 46
LOS: 1 days | End: 2021-11-09

## 2021-11-09 ENCOUNTER — APPOINTMENT (OUTPATIENT)
Dept: OPHTHALMOLOGY | Facility: CLINIC | Age: 46
End: 2021-11-09
Payer: COMMERCIAL

## 2021-11-09 ENCOUNTER — NON-APPOINTMENT (OUTPATIENT)
Age: 46
End: 2021-11-09

## 2021-11-09 DIAGNOSIS — Z00.00 ENCOUNTER FOR GENERAL ADULT MEDICAL EXAMINATION WITHOUT ABNORMAL FINDINGS: ICD-10-CM

## 2021-11-09 PROCEDURE — 92014 COMPRE OPH EXAM EST PT 1/>: CPT

## 2021-11-12 ENCOUNTER — TRANSCRIPTION ENCOUNTER (OUTPATIENT)
Age: 46
End: 2021-11-12

## 2021-11-30 ENCOUNTER — APPOINTMENT (OUTPATIENT)
Dept: OPHTHALMOLOGY | Facility: CLINIC | Age: 46
End: 2021-11-30
Payer: COMMERCIAL

## 2021-11-30 ENCOUNTER — NON-APPOINTMENT (OUTPATIENT)
Age: 46
End: 2021-11-30

## 2021-11-30 PROCEDURE — 92012 INTRM OPH EXAM EST PATIENT: CPT

## 2021-12-06 ENCOUNTER — LABORATORY RESULT (OUTPATIENT)
Age: 46
End: 2021-12-06

## 2021-12-06 ENCOUNTER — APPOINTMENT (OUTPATIENT)
Dept: INFECTIOUS DISEASE | Facility: CLINIC | Age: 46
End: 2021-12-06
Payer: COMMERCIAL

## 2021-12-06 VITALS
SYSTOLIC BLOOD PRESSURE: 146 MMHG | DIASTOLIC BLOOD PRESSURE: 83 MMHG | HEART RATE: 53 BPM | BODY MASS INDEX: 28.04 KG/M2 | TEMPERATURE: 97.6 F | WEIGHT: 152.38 LBS | OXYGEN SATURATION: 98 % | HEIGHT: 62 IN

## 2021-12-06 DIAGNOSIS — R76.8 OTHER SPECIFIED ABNORMAL IMMUNOLOGICAL FINDINGS IN SERUM: ICD-10-CM

## 2021-12-06 PROCEDURE — 99203 OFFICE O/P NEW LOW 30 MIN: CPT

## 2021-12-06 NOTE — HISTORY OF PRESENT ILLNESS
[FreeTextEntry1] : 46 year old female referred by Dr. 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 Symptoms began end of September when she had what she thought was pink eye. She was diagnosed with scleritis by Dr. Goldberg. Symptoms improved with Aleve and eye drops. Syphilis, quantiferon and lyme negative.  She recalls insect bite on R knee on 9/12. She felt insect bite then knee became swollen and red. At the time she was taking amoxicillin for dental work. \par \par Patient lives in [place], [place].  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  Has not been outside much this past summer.  No hiking trips.  No known tick exposures.  Never had Lyme's disease before.  Had cats recently (last was a year ago)

## 2021-12-06 NOTE — PHYSICAL EXAM
[General Appearance - Alert] : alert [Sclera] : the sclera and conjunctiva were normal [Outer Ear] : the ears and nose were normal in appearance [Neck Appearance] : the appearance of the neck was normal [Heart Rate And Rhythm] : heart rate was normal and rhythm regular [Edema] : there was no peripheral edema [Bowel Sounds] : normal bowel sounds [No Palpable Adenopathy] : no palpable adenopathy [Musculoskeletal - Swelling] : no joint swelling [] : no rash [Motor Exam] : the motor exam was normal [Oriented To Time, Place, And Person] : oriented to person, place, and time

## 2021-12-07 LAB
T GONDII AB SER-IMP: NEGATIVE
T GONDII AB SER-IMP: POSITIVE
T GONDII IGG SER QL: 74.2 IU/ML
T GONDII IGM SER QL: <3 AU/ML

## 2021-12-08 ENCOUNTER — APPOINTMENT (OUTPATIENT)
Dept: OBGYN | Facility: CLINIC | Age: 46
End: 2021-12-08

## 2021-12-08 LAB — B BURGDOR IGG+IGM SER QL IB: NORMAL

## 2021-12-13 LAB
B HENSELAE IGG SER-ACNC: NEGATIVE TITER
B HENSELAE IGM SER QL: NEGATIVE TITER
B QUINTANA IGG SER QL: NEGATIVE TITER
B QUINTANA IGM SER QL: NEGATIVE TITER

## 2021-12-29 ENCOUNTER — APPOINTMENT (OUTPATIENT)
Dept: ENDOCRINOLOGY | Facility: CLINIC | Age: 46
End: 2021-12-29

## 2021-12-30 ENCOUNTER — APPOINTMENT (OUTPATIENT)
Dept: OPHTHALMOLOGY | Facility: CLINIC | Age: 46
End: 2021-12-30

## 2022-04-18 ENCOUNTER — APPOINTMENT (OUTPATIENT)
Dept: OBGYN | Facility: CLINIC | Age: 47
End: 2022-04-18
Payer: COMMERCIAL

## 2022-04-18 VITALS
SYSTOLIC BLOOD PRESSURE: 131 MMHG | BODY MASS INDEX: 29.08 KG/M2 | WEIGHT: 158 LBS | DIASTOLIC BLOOD PRESSURE: 85 MMHG | HEIGHT: 62 IN

## 2022-04-18 PROCEDURE — 99396 PREV VISIT EST AGE 40-64: CPT

## 2022-04-18 PROCEDURE — 99213 OFFICE O/P EST LOW 20 MIN: CPT | Mod: 25

## 2022-04-18 RX ORDER — OFLOXACIN 3 MG/ML
0.3 SOLUTION/ DROPS OPHTHALMIC
Qty: 5 | Refills: 0 | Status: ACTIVE | COMMUNITY
Start: 2021-10-23

## 2022-04-18 RX ORDER — MONTELUKAST 10 MG/1
10 TABLET, FILM COATED ORAL
Qty: 30 | Refills: 0 | Status: ACTIVE | COMMUNITY
Start: 2021-10-23

## 2022-04-18 RX ORDER — AZITHROMYCIN 250 MG/1
250 TABLET, FILM COATED ORAL
Qty: 6 | Refills: 0 | Status: COMPLETED | COMMUNITY
Start: 2021-12-11

## 2022-04-18 RX ORDER — FLUTICASONE FUROATE AND VILANTEROL TRIFENATATE 100; 25 UG/1; UG/1
100-25 POWDER RESPIRATORY (INHALATION)
Qty: 60 | Refills: 0 | Status: ACTIVE | COMMUNITY
Start: 2022-04-13

## 2022-04-18 RX ORDER — AMOXICILLIN AND CLAVULANATE POTASSIUM 875; 125 MG/1; MG/1
875-125 TABLET, COATED ORAL
Qty: 14 | Refills: 0 | Status: COMPLETED | COMMUNITY
Start: 2021-12-27

## 2022-04-18 RX ORDER — PROMETHAZINE HYDROCHLORIDE AND DEXTROMETHORPHAN HYDROBROMIDE ORAL SOLUTION 15; 6.25 MG/5ML; MG/5ML
6.25-15 SOLUTION ORAL
Qty: 100 | Refills: 0 | Status: ACTIVE | COMMUNITY
Start: 2021-12-27

## 2022-04-18 RX ORDER — HYDROCHLOROTHIAZIDE 25 MG/1
25 TABLET ORAL
Qty: 30 | Refills: 0 | Status: ACTIVE | COMMUNITY
Start: 2022-04-13

## 2022-04-18 RX ORDER — PANTOPRAZOLE 40 MG/1
40 TABLET, DELAYED RELEASE ORAL
Qty: 5 | Refills: 0 | Status: ACTIVE | COMMUNITY
Start: 2022-01-26

## 2022-04-18 RX ORDER — PREDNISOLONE ACETATE 10 MG/ML
1 SUSPENSION/ DROPS OPHTHALMIC
Qty: 5 | Refills: 0 | Status: ACTIVE | COMMUNITY
Start: 2021-10-26

## 2022-04-18 RX ORDER — PREDNISONE 10 MG/1
10 TABLET ORAL
Qty: 10 | Refills: 0 | Status: ACTIVE | COMMUNITY
Start: 2022-01-26

## 2022-04-18 RX ORDER — LOSARTAN POTASSIUM 25 MG/1
25 TABLET, FILM COATED ORAL
Qty: 90 | Refills: 0 | Status: ACTIVE | COMMUNITY
Start: 2021-11-06

## 2022-04-18 RX ORDER — FLURBIPROFEN 100 MG
100 TABLET ORAL
Qty: 90 | Refills: 0 | Status: ACTIVE | COMMUNITY
Start: 2021-11-09

## 2022-04-18 NOTE — HISTORY OF PRESENT ILLNESS
[Localized Pain] : localized pain [Palpable Lump] : palpable lump [Regular Cycle Intervals] : periods have been regular [TextBox_17] : left axilla [FreeTextEntry1] : 6 days, ago

## 2022-04-18 NOTE — DISCUSSION/SUMMARY
[FreeTextEntry1] : Examination and Pap smear was done,\par 1 patient was recommended to go for both screening and a left diagnostic mammogram and sonogram patient also was recommended to follow-up with the breast surgeon.\par Examination reveals fibroid uterus patient recommended to have transvaginal sonogram to monitor the growth of the fibroid.\par Patient currently has no menstrual problems with the fibroids.

## 2022-04-20 ENCOUNTER — APPOINTMENT (OUTPATIENT)
Dept: OPHTHALMOLOGY | Facility: CLINIC | Age: 47
End: 2022-04-20

## 2022-04-20 LAB — HPV HIGH+LOW RISK DNA PNL CVX: NOT DETECTED

## 2022-04-27 ENCOUNTER — NON-APPOINTMENT (OUTPATIENT)
Age: 47
End: 2022-04-27

## 2022-05-09 ENCOUNTER — NON-APPOINTMENT (OUTPATIENT)
Age: 47
End: 2022-05-09

## 2022-05-09 ENCOUNTER — APPOINTMENT (OUTPATIENT)
Dept: OPHTHALMOLOGY | Facility: CLINIC | Age: 47
End: 2022-05-09
Payer: COMMERCIAL

## 2022-05-09 LAB — CYTOLOGY CVX/VAG DOC THIN PREP: ABNORMAL

## 2022-05-09 PROCEDURE — 92012 INTRM OPH EXAM EST PATIENT: CPT

## 2022-05-10 PROBLEM — M79.629 PAIN, AXILLARY: Status: ACTIVE | Noted: 2022-05-10

## 2022-05-10 PROBLEM — M79.89 AXILLARY SWELLING: Status: ACTIVE | Noted: 2022-04-18

## 2022-05-11 ENCOUNTER — APPOINTMENT (OUTPATIENT)
Dept: SURGICAL ONCOLOGY | Facility: CLINIC | Age: 47
End: 2022-05-11
Payer: COMMERCIAL

## 2022-05-11 VITALS
SYSTOLIC BLOOD PRESSURE: 127 MMHG | DIASTOLIC BLOOD PRESSURE: 70 MMHG | OXYGEN SATURATION: 95 % | HEIGHT: 61 IN | BODY MASS INDEX: 29.83 KG/M2 | HEART RATE: 62 BPM | RESPIRATION RATE: 16 BRPM | WEIGHT: 158 LBS

## 2022-05-11 DIAGNOSIS — M79.629 PAIN IN UNSPECIFIED UPPER ARM: ICD-10-CM

## 2022-05-11 DIAGNOSIS — Z78.9 OTHER SPECIFIED HEALTH STATUS: ICD-10-CM

## 2022-05-11 DIAGNOSIS — M79.89 OTHER SPECIFIED SOFT TISSUE DISORDERS: ICD-10-CM

## 2022-05-11 PROCEDURE — 99243 OFF/OP CNSLTJ NEW/EST LOW 30: CPT

## 2022-05-11 NOTE — ASSESSMENT
[FreeTextEntry1] : The patient is a 47 year old female with L axillary pain and swelling x 2 years, most recent imaging 4/22/2022 shows no suspicious findings, BR1.\par \par The patient previously saw another surgeon in 10/2020 for the same issue and was given reassurance at the time. Imaging at that time was also normal.\par \par Exam today was unremarkable. No significant mass or breast tissue in left axilla appreciated.\par \par We discussed breast pain, which is one of the most common breast complaints seen in the primary care setting. \par We discussed that spontaneous resolution of breast pain is common. The patient was reassured that breast pain is not a risk factor for breast cancer. NSAIDs are appropriate to help with pain. We also discussed evening primrose oil, which has not been shown to have a definitive effect on pain, but is over-the-counter, and may help. A well-fitting bra or sports bra may also help with compression and support of the bra and help reduce any breast discomfort.\par \par Caffeine intake, iodine deficiency, and dietary fat intake have not been definitively established as causal factors in breast pain, and dietary modifications are not effective treatments. However, these are low-risk options that may have some benefit.\par \par Ms Colon verbalized her understanding of the plan, and risks/benefits/complications and alternatives were discussed. All questions were answered. She knows to call me if she has any additional questions or concerns. \par \par Plan:\par  - RTO PRN

## 2022-05-11 NOTE — HISTORY OF PRESENT ILLNESS
[FreeTextEntry1] : The patient is a 47 year old female referred for consultation by Dr. Elzbieta Webb for Left axillary swelling and pain.\par \par The patient reports that she first noticed left armpit swelling in August 2020.  She presented to her GYN who sent her for baseline mammogram and sonographic imaging.  This was reportedly negative and she was then sent to see a breast surgeon.  He reassured the patient and counseled on the breast pain, and did not think that there was any significant swelling to be concerned about.  Since then the patient notes that the left armpit swelling has gotten larger and she has more pain.  She reports that pain is radiating down to the nipple, and is "stinging" in nature.  Denies any breast masses, denies nipple discharge.  She does note gaining about 20 pounds during the time that she has noticed that the left axillary swelling has increased.\par \par Her most recent breast imaging showed:\par \par 4/22/2022 B/L DM (ZP) scattered fibroglandular densities \par  - Negative\par  - BR1\par \par 4/22/2022 B/L US\par  - Negative\par  - BR1\par \par She has history of Lyme disease, now fully recovered, hypertension for which she takes metoprolol N1 new medication that she does not recall the name of, and kidney stones.  She is only had history of an appendectomy in the past.\par She denies any family history of breast cancer.  Her paternal grandmother had ovarian cancer in her 50s.

## 2022-05-11 NOTE — PHYSICAL EXAM
[Normocephalic] : normocephalic [Atraumatic] : atraumatic [EOMI] : extra ocular movement intact [PERRL] : pupils equal, round and reactive to light [Sclera nonicteric] : sclera nonicteric [Conjunctiva pink] : conjunctiva pink [Supple] : supple [No Supraclavicular Adenopathy] : no supraclavicular adenopathy [No Cervical Adenopathy] : no cervical adenopathy [No Thyromegaly] : no thyromegaly [Examined in the supine and seated position] : examined in the supine and seated position [Symmetrical] : symmetrical [Bra Size: ___] : Bra Size: [unfilled] [None] : no ptosis [No dominant masses] : no dominant masses in right breast  [No dominant masses] : no dominant masses left breast [No Nipple Retraction] : no left nipple retraction [No Nipple Discharge] : no left nipple discharge [Breast Mass Right Breast ___cm] : no masses [Breast Mass Left Breast ___cm] : no masses [Breast Nipple Inversion] : nipples not inverted [Breast Nipple Retraction] : nipples not retracted [Breast Nipple Flattening] : nipples not flattened [Breast Nipple Fissures] : nipples not fissured [Breast Abnormal Lactation (Galactorrhea)] : no galactorrhea [Breast Abnormal Secretion Bloody Fluid] : no bloody discharge [Breast Abnormal Secretion Serous Fluid] : no serous discharge [Breast Abnormal Secretion Opalescent Fluid] : no milky discharge [No Axillary Lymphadenopathy] : no left axillary lymphadenopathy [No Edema] : no edema [No Rashes] : no rashes [No Ulceration] : no ulceration [de-identified] : non-labored respirations  [de-identified] : no obvious masses, not significantly different from right axilla

## 2022-05-11 NOTE — CONSULT LETTER
[Dear  ___] : Dear  [unfilled], [Consult Letter:] : I had the pleasure of evaluating your patient, [unfilled]. [Please see my note below.] : Please see my note below. [Consult Closing:] : Thank you very much for allowing me to participate in the care of this patient.  If you have any questions, please do not hesitate to contact me. [Sincerely,] : Sincerely, [FreeTextEntry3] : Leigha Krishna MD\par Breast Surgeon\par Division of Surgical Oncology\par Department of Surgery\par [address] [place] [address]\[place] [address] \par Tel: [phone]\par Fax: [phone]\par Email: [email or web address]  [DrGus  ___] : Dr. QUIGLEY

## 2022-05-16 ENCOUNTER — ASOB RESULT (OUTPATIENT)
Age: 47
End: 2022-05-16

## 2022-05-16 ENCOUNTER — APPOINTMENT (OUTPATIENT)
Dept: OBGYN | Facility: CLINIC | Age: 47
End: 2022-05-16
Payer: COMMERCIAL

## 2022-05-16 VITALS
SYSTOLIC BLOOD PRESSURE: 129 MMHG | HEIGHT: 61 IN | BODY MASS INDEX: 29.83 KG/M2 | DIASTOLIC BLOOD PRESSURE: 82 MMHG | WEIGHT: 158 LBS

## 2022-05-16 DIAGNOSIS — R87.615 UNSATISFACTORY CYTOLOGIC SMEAR OF CERVIX: ICD-10-CM

## 2022-05-16 PROCEDURE — 76830 TRANSVAGINAL US NON-OB: CPT

## 2022-05-16 PROCEDURE — 99213 OFFICE O/P EST LOW 20 MIN: CPT | Mod: NC

## 2022-05-18 ENCOUNTER — NON-APPOINTMENT (OUTPATIENT)
Age: 47
End: 2022-05-18

## 2022-05-18 LAB
CYTOLOGY CVX/VAG DOC THIN PREP: NORMAL
HPV HIGH+LOW RISK DNA PNL CVX: NOT DETECTED

## 2022-06-14 ENCOUNTER — NON-APPOINTMENT (OUTPATIENT)
Age: 47
End: 2022-06-14

## 2023-03-25 ENCOUNTER — EMERGENCY (EMERGENCY)
Facility: HOSPITAL | Age: 48
LOS: 1 days | Discharge: HOME | End: 2023-03-25
Attending: STUDENT IN AN ORGANIZED HEALTH CARE EDUCATION/TRAINING PROGRAM | Admitting: STUDENT IN AN ORGANIZED HEALTH CARE EDUCATION/TRAINING PROGRAM
Payer: COMMERCIAL

## 2023-03-25 VITALS
SYSTOLIC BLOOD PRESSURE: 136 MMHG | HEART RATE: 62 BPM | RESPIRATION RATE: 17 BRPM | OXYGEN SATURATION: 100 % | DIASTOLIC BLOOD PRESSURE: 87 MMHG | TEMPERATURE: 98 F

## 2023-03-25 PROCEDURE — 76700 US EXAM ABDOM COMPLETE: CPT | Mod: 26

## 2023-03-25 PROCEDURE — 99284 EMERGENCY DEPT VISIT MOD MDM: CPT

## 2023-03-25 NOTE — ED ADULT NURSE REASSESSMENT NOTE - NS ED NURSE REASSESS COMMENT FT1
Pt resting in stretcher comfortably. Respirations are even and unlabored, skin intact. Pt does not offer any acute complaints at this time. Pt updated on plan of care. Awaiting US

## 2023-03-26 NOTE — ED PROVIDER NOTE - OBJECTIVE STATEMENT
48 yo F hx appendectomy, kidney stones, hepatic steatosis, presenting with R sided abdominal pain x 2 days with associated nausea and diarrhea. No fevers/chills. She finished a Z pack earlier this week for bronchitis. Denies fevers/chills, black/bloody stools. No recent travel. Reports only 3-4 episodes diarrhea/day associated with food intake. No urinary sx. No abn vaginal discharge/bleeding.

## 2023-03-26 NOTE — ED PROVIDER NOTE - NSFOLLOWUPINSTRUCTIONS_ED_ALL_ED_FT
Your liver enzymes are slightly elevated, follow up with your primary care doctor for repeat lab work.    Ultrasound shows hepatic steatosis. No gallstones or kidney stones seen. No dilation of ureters seen to suggest large kidney stone/obstruction.     Abdominal Pain    Many things can cause abdominal pain. Many times, abdominal pain is not caused by a disease and will improve without treatment. Your health care provider will do a physical exam to determine if there is a dangerous cause of your pain; blood tests and imaging may help determine the cause of your pain. However, in many cases, no cause may be found and you may need further testing as an outpatient. Monitor your abdominal pain for any changes.     SEEK IMMEDIATE MEDICAL CARE IF YOU HAVE ANY OF THE FOLLOWING SYMPTOMS: worsening abdominal pain, uncontrollable vomiting, profuse diarrhea, inability to have bowel movements or pass gas, black or bloody stools, fever accompanying chest pain or back pain, or fainting. These symptoms may represent a serious problem that is an emergency. Do not wait to see if the symptoms will go away. Get medical help right away. Call 911 and do not drive yourself to the hospital.

## 2023-03-26 NOTE — ED PROVIDER NOTE - PHYSICAL EXAMINATION
VITALS: reviewed  GEN: NAD, A & O x 4  HEAD/EYES: NCAT, EOMI, anicteric sclerae,   ENT: mucus membranes moist, oropharynx WNL, trachea midline,  RESP: lungs CTA with equal breath sounds bilaterally, chest wall nontender and atraumatic  CV: heart with reg rhythm S1, S2, distal pulses intact and symmetric bilaterally  ABDOMEN: normoactive bowel sounds, soft, nondistended, RUQ ttp, no palpable masses  : no CVAT  MSK: extremities atraumatic and nontender, no edema, no asymmetry.  SKIN: warm, dry, no rash, no bruising, no cyanosis. color appropriate for ethnicity  NEURO: alert, mentating appropriately, no facial asymmetry.   PSYCH: Affect appropriate

## 2023-03-26 NOTE — ED PROVIDER NOTE - NSICDXPASTMEDICALHX_GEN_ALL_CORE_FT
PAST MEDICAL HISTORY:  HTN (hypertension)     Kidney stones     No pertinent past medical history

## 2023-03-26 NOTE — ED PROVIDER NOTE - CLINICAL SUMMARY MEDICAL DECISION MAKING FREE TEXT BOX
46 yo F presenting with abdominal pain x 2 days. Labs, US, fluids, meds, reassess.    US negative for acute cathy, no hydronephrosis. UA negative. elevated LFTs rec follow up with pcp for repeat lab work

## 2023-03-26 NOTE — ED PROVIDER NOTE - PATIENT PORTAL LINK FT
You can access the FollowMyHealth Patient Portal offered by [facility] by registering at the following website: [email or web address]. By joining [facility]’s FollowMyHealth portal, you will also be able to view your health information using other applications (apps) compatible with our system.

## 2023-04-26 ENCOUNTER — APPOINTMENT (OUTPATIENT)
Dept: OBGYN | Facility: CLINIC | Age: 48
End: 2023-04-26
Payer: COMMERCIAL

## 2023-04-26 VITALS
DIASTOLIC BLOOD PRESSURE: 76 MMHG | WEIGHT: 146 LBS | HEIGHT: 61 IN | BODY MASS INDEX: 27.56 KG/M2 | SYSTOLIC BLOOD PRESSURE: 122 MMHG

## 2023-04-26 DIAGNOSIS — Z01.419 ENCOUNTER FOR GYNECOLOGICAL EXAMINATION (GENERAL) (ROUTINE) W/OUT ABNORMAL FINDINGS: ICD-10-CM

## 2023-04-26 PROCEDURE — 99213 OFFICE O/P EST LOW 20 MIN: CPT | Mod: 25

## 2023-04-26 PROCEDURE — 99396 PREV VISIT EST AGE 40-64: CPT

## 2023-04-26 NOTE — HISTORY OF PRESENT ILLNESS
[Patient reported mammogram was normal] : Patient reported mammogram was normal [Patient reported PAP Smear was normal] : Patient reported PAP Smear was normal [Mammogramdate] : 2022 [PapSmeardate] : 2022 [Diffuse] : diffuse [Irregular Menstrual Interval] : irregular menstrual interval [FreeTextEntry1] : 4/19/23

## 2023-04-26 NOTE — PLAN
[FreeTextEntry1] : Examination and Pap smear was done,\par Patient is seeing urologist for kidney stones.\par Patient recommended to have transvaginal sonogram to monitor fibroids and hydrosalpinx.\par Patient recommended to see gastroenterologist.

## 2023-04-28 LAB — HPV HIGH+LOW RISK DNA PNL CVX: NOT DETECTED

## 2023-05-01 LAB — CYTOLOGY CVX/VAG DOC THIN PREP: NORMAL

## 2023-05-25 ENCOUNTER — ASOB RESULT (OUTPATIENT)
Age: 48
End: 2023-05-25

## 2023-05-25 ENCOUNTER — APPOINTMENT (OUTPATIENT)
Dept: OBGYN | Facility: CLINIC | Age: 48
End: 2023-05-25
Payer: COMMERCIAL

## 2023-05-25 PROCEDURE — 76830 TRANSVAGINAL US NON-OB: CPT

## 2023-05-31 ENCOUNTER — NON-APPOINTMENT (OUTPATIENT)
Age: 48
End: 2023-05-31

## 2023-05-31 ENCOUNTER — APPOINTMENT (OUTPATIENT)
Dept: OBGYN | Facility: CLINIC | Age: 48
End: 2023-05-31
Payer: COMMERCIAL

## 2023-05-31 VITALS — HEIGHT: 61 IN | BODY MASS INDEX: 27.38 KG/M2 | WEIGHT: 145 LBS

## 2023-05-31 PROCEDURE — 36415 COLL VENOUS BLD VENIPUNCTURE: CPT

## 2023-05-31 PROCEDURE — 99213 OFFICE O/P EST LOW 20 MIN: CPT

## 2023-05-31 NOTE — HISTORY OF PRESENT ILLNESS
[Localized] : localized [TextBox_7] : FARIDA [Regular Cycle Intervals] : periods have been regular [FreeTextEntry1] : 3 weeks. ago

## 2023-05-31 NOTE — DISCUSSION/SUMMARY
[FreeTextEntry1] : Sonogram results discussed with patient in detail,\par Tumor markers was drawn,\par Patient was sent for pelvic MRI,\par Patient to see her PCP for right upper quadrant pain, patient states that she has fatty liver.

## 2023-06-05 LAB — CEA SERPL-MCNC: 0.6 NG/ML

## 2023-06-08 ENCOUNTER — APPOINTMENT (OUTPATIENT)
Dept: MRI IMAGING | Facility: CLINIC | Age: 48
End: 2023-06-08
Payer: COMMERCIAL

## 2023-06-08 PROCEDURE — A9585: CPT

## 2023-06-08 PROCEDURE — 72197 MRI PELVIS W/O & W/DYE: CPT

## 2023-06-12 ENCOUNTER — NON-APPOINTMENT (OUTPATIENT)
Age: 48
End: 2023-06-12

## 2023-06-12 LAB
CA 125 (LABCORP): 7.6 U/ML
CA125 (BIOREF): 7.6
CANCER AG125 SERPL-ACNC: 8 U/ML
DISCLAIMER (BIOREF): NORMAL
HE4X: 43.2 PMOL/L
METHODSANDLIMITATIONS: NORMAL
OVA1: 3.2
OVA1PLUS: NORMAL
POSTMENOPAUSAL ROMA: 0.64
PREMENOPAUSAL ROMA: 0.52
REFERENCES: NORMAL
RISK OF MALIGNANCY POSTMENOPAUSAL/HIGH RISK ULTRASOUND: NORMAL
RISK OF MALIGNANCY POSTMENOPAUSAL/LOW RISK ULTRASOUND: NORMAL
RISK OF MALIGNANCY PREMENOPAUSAL/HIGH RISK ULTRASOUND: NORMAL
RISK OF MALIGNANCY PREMENOPAUSAL/LOW RISK ULTRASOUND: NORMAL
ROMA COMMENT: NORMAL

## 2023-06-19 PROBLEM — D21.9 FIBROIDS: Status: ACTIVE | Noted: 2020-09-03

## 2023-06-26 ENCOUNTER — APPOINTMENT (OUTPATIENT)
Dept: GYNECOLOGIC ONCOLOGY | Facility: CLINIC | Age: 48
End: 2023-06-26
Payer: COMMERCIAL

## 2023-06-26 VITALS
BODY MASS INDEX: 27.38 KG/M2 | SYSTOLIC BLOOD PRESSURE: 137 MMHG | HEART RATE: 73 BPM | HEIGHT: 61 IN | DIASTOLIC BLOOD PRESSURE: 77 MMHG | WEIGHT: 145 LBS

## 2023-06-26 DIAGNOSIS — Z78.9 OTHER SPECIFIED HEALTH STATUS: ICD-10-CM

## 2023-06-26 DIAGNOSIS — I10 ESSENTIAL (PRIMARY) HYPERTENSION: ICD-10-CM

## 2023-06-26 DIAGNOSIS — R10.9 UNSPECIFIED ABDOMINAL PAIN: ICD-10-CM

## 2023-06-26 DIAGNOSIS — D21.9 BENIGN NEOPLASM OF CONNECTIVE AND OTHER SOFT TISSUE, UNSPECIFIED: ICD-10-CM

## 2023-06-26 DIAGNOSIS — R10.11 RIGHT UPPER QUADRANT PAIN: ICD-10-CM

## 2023-06-26 DIAGNOSIS — Z80.41 FAMILY HISTORY OF MALIGNANT NEOPLASM OF OVARY: ICD-10-CM

## 2023-06-26 DIAGNOSIS — K90.49 MALABSORPTION DUE TO INTOLERANCE, NOT ELSEWHERE CLASSIFIED: ICD-10-CM

## 2023-06-26 PROCEDURE — 99205 OFFICE O/P NEW HI 60 MIN: CPT

## 2023-07-05 ENCOUNTER — RESULT REVIEW (OUTPATIENT)
Age: 48
End: 2023-07-05

## 2023-07-10 ENCOUNTER — APPOINTMENT (OUTPATIENT)
Dept: CT IMAGING | Facility: CLINIC | Age: 48
End: 2023-07-10

## 2023-08-24 ENCOUNTER — APPOINTMENT (OUTPATIENT)
Dept: GASTROENTEROLOGY | Facility: CLINIC | Age: 48
End: 2023-08-24
Payer: COMMERCIAL

## 2023-08-24 VITALS — BODY MASS INDEX: 26.45 KG/M2 | WEIGHT: 140 LBS

## 2023-08-24 VITALS
BODY MASS INDEX: 27.19 KG/M2 | HEIGHT: 61 IN | DIASTOLIC BLOOD PRESSURE: 80 MMHG | SYSTOLIC BLOOD PRESSURE: 120 MMHG | OXYGEN SATURATION: 98 % | HEART RATE: 78 BPM | WEIGHT: 144 LBS

## 2023-08-24 DIAGNOSIS — Z12.11 ENCOUNTER FOR SCREENING FOR MALIGNANT NEOPLASM OF COLON: ICD-10-CM

## 2023-08-24 PROCEDURE — 99203 OFFICE O/P NEW LOW 30 MIN: CPT

## 2023-08-24 RX ORDER — SODIUM SULFATE, POTASSIUM SULFATE AND MAGNESIUM SULFATE 1.6; 3.13; 17.5 G/177ML; G/177ML; G/177ML
17.5-3.13-1.6 SOLUTION ORAL
Qty: 1 | Refills: 0 | Status: ACTIVE | COMMUNITY
Start: 2023-08-24 | End: 1900-01-01

## 2023-08-25 ENCOUNTER — OUTPATIENT (OUTPATIENT)
Dept: OUTPATIENT SERVICES | Facility: HOSPITAL | Age: 48
LOS: 1 days | End: 2023-08-25
Payer: COMMERCIAL

## 2023-08-25 ENCOUNTER — APPOINTMENT (OUTPATIENT)
Dept: CT IMAGING | Facility: IMAGING CENTER | Age: 48
End: 2023-08-25
Payer: COMMERCIAL

## 2023-08-25 DIAGNOSIS — N70.11 CHRONIC SALPINGITIS: ICD-10-CM

## 2023-08-25 DIAGNOSIS — R10.9 UNSPECIFIED ABDOMINAL PAIN: ICD-10-CM

## 2023-08-25 DIAGNOSIS — R10.11 RIGHT UPPER QUADRANT PAIN: ICD-10-CM

## 2023-08-25 DIAGNOSIS — N94.89 OTHER SPECIFIED CONDITIONS ASSOCIATED WITH FEMALE GENITAL ORGANS AND MENSTRUAL CYCLE: ICD-10-CM

## 2023-08-25 PROCEDURE — 74160 CT ABDOMEN W/CONTRAST: CPT | Mod: 26

## 2023-08-25 PROCEDURE — 74160 CT ABDOMEN W/CONTRAST: CPT

## 2023-08-28 NOTE — HISTORY OF PRESENT ILLNESS
[FreeTextEntry1] : 48F, HTN, s/p appendectomy, ?hx of fatty liver disease, following w GYN ONC for fibroids and hydrosalpinx, here today for abdominal pain and irregular bowel movements.   Patient describes right sided abdominal pain x 3 months.  Dull pain.  Associated w nausea, no vomiting Denies heartburn, dysphagia, odynophagia Worse with ETOH and greasy foods  Taking a liver supplement from [employer] and feels that that has helped  Abd u/s - hepatic steatosis   She reports long standing constipation  More loose stools/diarrhea if she is consuming etoh and greasy foods  Stool not necessary oily/yellow   No blood in stool   She consumes about 12 drinks per week on a "slow week." States she drank even more heavily previously  Trying to reduce etoh consumption   No prior egd/colon No fhx of GI malignancy

## 2023-08-28 NOTE — ASSESSMENT
[FreeTextEntry1] : 48F, HTN, s/p appendectomy, ?hx of fatty liver disease, following w GYN ONC for fibroids and hydrosalpinx, here today for abdominal pain and irregular bowel movements.  # Irregular bowel movements - primarily constipation, some loose stools related to diet (fatty foods, etoh)  - start trial of fiber supplement  - food/symptom journal   # Abdominal pain  - right sided abd pain x several months - abd u/s showing only sludge  - ct abd/pelvis ordered by gyn/onc, patient encouraged to make appointment  - she will send over bloodwork done at PCP office   # Hepatic Steatosis  # ETOH use  - noted on imaging - Labs march 2023 - AST 34, ALT 56, ALP 71, tbili 0.4  - fatty liver vs related to etoh use  - patient is trying to limit etoh use, discused AA or similar program vs social work referral. She will look into resources on her own  - will refer for fibroscan pending review of abd u/s and labs   # Colon cancer screening - no fhx of colon cancer - no prior cscope  - patient elects to proceed w colonoscopy. R/B/A reviewed in detail  - prep sent to pharmacy on file  RTC after cscope, imaging

## 2023-09-01 ENCOUNTER — APPOINTMENT (OUTPATIENT)
Dept: GYNECOLOGIC ONCOLOGY | Facility: CLINIC | Age: 48
End: 2023-09-01
Payer: COMMERCIAL

## 2023-09-01 VITALS
WEIGHT: 140 LBS | DIASTOLIC BLOOD PRESSURE: 67 MMHG | SYSTOLIC BLOOD PRESSURE: 147 MMHG | BODY MASS INDEX: 26.43 KG/M2 | HEIGHT: 61 IN | HEART RATE: 47 BPM

## 2023-09-01 DIAGNOSIS — N88.2 STRICTURE AND STENOSIS OF CERVIX UTERI: ICD-10-CM

## 2023-09-01 DIAGNOSIS — N70.11 CHRONIC SALPINGITIS: ICD-10-CM

## 2023-09-01 DIAGNOSIS — D21.9 BENIGN NEOPLASM OF CONNECTIVE AND OTHER SOFT TISSUE, UNSPECIFIED: ICD-10-CM

## 2023-09-01 DIAGNOSIS — N94.89 OTHER SPECIFIED CONDITIONS ASSOCIATED WITH FEMALE GENITAL ORGANS AND MENSTRUAL CYCLE: ICD-10-CM

## 2023-09-01 PROCEDURE — 99214 OFFICE O/P EST MOD 30 MIN: CPT

## 2023-09-01 NOTE — DISCUSSION/SUMMARY
[Reviewed Clinical Lab Test(s)] : Results of clinical tests were reviewed. [Reviewed Radiology Report(s)] : Radiology reports were reviewed. [FreeTextEntry1] : -I met again with the pt. -We discussed the clinical findings, the inability to perform an endometrial biopsy likely due to stenosis.  We discussed the CT findings in detail as well as her other imaging reports.  We discussed her concern for the right abdominal discomfort which she feels may even have increased (at 1 point she was considering going to the emergency department.  The differential diagnosis was again discussed.   -We again discussed her report of right upper quadrant and her concern for gallbladder disease.  She notes having significantly reduced her alcohol consumption.  She is aware of the hepatic findings. -We reviewed the laboratory test that she brought and will be scanned into the CD. -Management options were reviewed, including the role of adnexectomy, bilateral salpingectomy, and potentially ovarian conservation unless malignancy is identified where the intraoperative course mandates that.  We discussed the role of hysterectomy versus uterine sampling.  We discussed the routes of surgery, particularly if she wishes to pursue hysterectomy I would favor an open procedure through a vertical incision.  We discussed the potential need to repair an umbilical hernia though 1 was not clinically apparent on exam.  Potential role of staging has been reviewed.  The rationale for ovarian conservation was again reviewed as well as its risks and benefits.  -Periop and recuperative issues has been discussed, as were the possible hazards and risks of surgery, including but not limited to infection, thromboembolic disease and its life-threatening nature, transfusion, and surrounding organ injury (urinary, bowel, vascular, and neural), incision issues. -A diagram was reviewed together during the visit. -We await her plan GI follow-up for further disposition, and discussed a follow-up TH visit. -Her instructions were reviewed. -All Q/A.  -Letter TF. -SK tasked.

## 2023-09-01 NOTE — PHYSICAL EXAM
[Chaperone Present] : A chaperone was present in the examining room during all aspects of the physical examination [Abnormal] : Bimanual Exam: Abnormal [Normal] : Anus and perineum: Normal sphincter tone, no masses, no prolapse. [Fully active, able to carry on all pre-disease performance without restriction] : Status 0 - Fully active, able to carry on all pre-disease performance without restriction [FreeTextEntry1] : H [Absent] : CVAT: absent [de-identified] : Trace edema [de-identified] : periumb scar [de-identified] : No peritoneal signs; mild right pelvic and right upper quadrant discomfort; no guarding [de-identified] : No palpable hernia even with Valsalva [de-identified] : A small amount of menstrual blood was seen. [de-identified] : No CMT [de-identified] : The uterus was enlarged to 3-4 FB above the pubis [de-identified] : The adnexa were nonpalpable with mild discomfort on the right [de-identified] : The uterus was enlarged.  [de-identified] : I was unable to perform an endometrial biopsy due to stenosis of the cervix

## 2023-09-01 NOTE — PAST MEDICAL HISTORY
[Menstruating] : The patient is menstruating [Definite ___ (Date)] : the last menstrual period was [unfilled] [Total Preg ___] : G[unfilled] [Abortions ___] : Abortions:[unfilled]

## 2023-09-01 NOTE — HISTORY OF PRESENT ILLNESS
[FreeTextEntry1] : Referring GYN - Dr. Elzbieta Webb PCP - Dr. Jenaro Temple GI - none  HPI: Ms. Colon presents for evaluation.  She reports continued and perhaps increased right abdominal discomfort.  She is to follow-up with GI for further recommendations.  She had the CT scan of the abdomen as recommended.  She brought in laboratory values to review.  Hx: She is a 47 yo  premenopausal female (LMP 6/23/23) who presents for initial consultation at the request of Dr. Webb for the management of a hydrosalpinx. She was seen for annual GYN exam on 4/26/23 at which time she reported intermittent pelvic pain. Imaging was consistent with a right adnexal hydrosalpinx and a clear cyst increased in size since 5/2022. Tumor markers were normal. She was referred here for further management.   She notes vague right sided pelvic discomfort. For about a month she also notes RUQ and right flank discomfort; she notes this has not been disc with her providers. She has been advised to get a c-scope. 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   Disc FHx.   She subseq reports for last 2 yrs in summer, Jun or Jul she experienced 3-4 wks of bleeding. Since last Jun menses q4-6w and about 4 d. She reports no prior biopsy of EM.   LABS on 5/31/23: CA-125 was 8 (ref <38) CEA was 0.6 (ref <3.8) Premenopausal DORY was 0.52 (ref<1.14) DORY CA-125 as 7.6 (ref< 38.1) OVA-1 was 3.2 (ref<4.4)   IMAGING: CT A/P on 8/25/23: LOWER CHEST: Within normal limits. LIVER: Hepatic steatosis.. Hepatomegaly, measuring 18 cm. BILE DUCTS: Normal caliber. GALLBLADDER: Within normal limits. SPLEEN: Within normal limits. PANCREAS: Within normal limits. ADRENALS: Within normal limits. KIDNEYS/URETERS: Within normal limits. VISUALIZED PORTIONS: BOWEL: Within normal limits. PERITONEUM: No ascites. VESSELS: Within normal limits. RETROPERITONEUM/LYMPH NODES: No lymphadenopathy. ABDOMINAL WALL: Small fat-containing umbilical hernia. BONES: Within normal limits. Additional: Cystic type lesions in the presacral space superiorly partially visualized measuring 4.1 x 3.4 cm may represent right ovarian cyst or hydrosalpinx, likely corresponding to previously noted hydrosalpinx on MRI of 6/8/2023. IMPRESSION: Hepatomegaly with hepatic steatosis. Partially visualized cystic hypodensities in the superior presacral space likely corresponding to previously noted hydrosalpinx and/or right ovarian cyst seen on MRI pelvis 6/8/2023. Pelvic ultrasound can be obtained as indicated.   Pelvic MRI on 6/8/23: UTERUS: Measures 11.5 x 6.4 x 6.0 cm. Posterior fundal intramural fibroid measuring 5.1 x 4.5 x 4.8 cm. Right posterior body subserosal fibroid measuring 2.0 x 1.2 x 1.6 cm. Nabothian cysts. ENDOMETRIUM: Within normal limits. Measures up to 4 mm in thickness. JUNCTIONAL ZONE: Within normal limits. RIGHT OVARY: Measures 3.8 x 1.8 x 2.0 cm. Simple appearing right ovarian cyst measuring 2.8 x 1.5 x 1.7 cm. LEFT OVARY: Measures 2.2 x 1.2 x 1.6 cm. Within normal limits. ADNEXA: Fluid-filled tubular structure within the right adnexa measuring up to 3.0 cm in diameter and approximately 12 cm in length, consistent with hydrosalpinx. BLADDER: Within normal limits. LYMPH NODES: No pelvic lymphadenopathy. VISUALIZED PORTIONS: ABDOMINAL ORGANS: Within normal limits. BOWEL: Within normal limits. PERITONEUM: Trace fluid in the right adnexa. VESSELS: Within normal limits. ABDOMINAL WALL: Within normal limits. BONES: Degenerative changes. THIGHS: Partially imaged intramuscular fatty lesion within the left quadriceps, most likely representing a lipoma. IMPRESSION: Fluid-filled tubular structure within the right adnexa, compatible with hydrosalpinx. Simple appearing right ovarian cyst measuring 2.8 cm. Uterine fibroids. Trace fluid within the right adnexa.   TVUS on 5/16/22: AV uterus 6.84 x 6.43 x 4.63 cm. Myoma 5.94 x 5.4 x 4.47 cm. EML 6.06 mm RTO - 2.68 x 2.17 x 1.69 cm. Clear cyst 2.0 cm. Right adnexal fluid elongated collection adjacent to the right ovary 4.6 cm.  LTO - 2.58 x 2.03 x 1.56 cm  TVUS on 5/27/23: AV uterus 6.22 x 6.06 x 5.03 cm. Myoma 5.89 x 5.61 x 4.5 cm. EML 2.86 mm.  RTO - 4.05 x 2.0 x 2.68 cm. Clear cyst 3.1 cm. Right adnexal fluid elongated collection seen adjacent to the ovary 7.2 cm, previously 4.6 cm on 5/16/22 LTO - 1.76 x 1.21 x 1.16 cm.    TVUS on 9/11/20: uterus 11.7 x 6.7 x 7.3 cm. Evidence of a 6.1 x 5.6 x 5.7 cm sized posterior intramural fibroid with secondary distortion of the endometrium. 2 cm nabothian cyst. EML 9 mm.  RTO - 2.4 x 1.7 x 1.9 cm. LTO - 2.0 x 1.4 x 1.7 cm No FF.   PMHx: HTN PSHx: laparoscopic appendectomy Fam Hx: PGM (ovarian cancer)   HCM: PAP on 4/26/23: NEGATIVE, HrHPV negative Mammogram: 4/2022 - BIRADS 1 CBE: 4/2023 SK SBE: performs Colonoscopy: never  COVID-19 vaccine series completed

## 2023-09-18 RX ORDER — POLYETHYLENE GLYCOL-3350 AND ELECTROLYTES 236; 6.74; 5.86; 2.97; 22.74 G/274.31G; G/274.31G; G/274.31G; G/274.31G; G/274.31G
236 POWDER, FOR SOLUTION ORAL
Qty: 1 | Refills: 0 | Status: ACTIVE | COMMUNITY
Start: 2023-08-28 | End: 1900-01-01

## 2023-09-28 NOTE — PRE PROCEDURE NOTE - PRE PROCEDURE EVALUATION
Attending Physician: Dr. Mitchell                     Procedure: colonoscopy    Indication for Procedure: screening colonoscopy   ________________________________________________________  PAST MEDICAL & SURGICAL HISTORY:  No pertinent past medical history      Kidney stones      HTN (hypertension)      No significant past surgical history        ALLERGIES:  No Known Allergies    HOME MEDICATIONS:    AICD/PPM: [ ] yes   [X] no    PERTINENT LAB DATA:                      PHYSICAL EXAMINATION:    T(C): --  HR: --  BP: --  RR: --  SpO2: --  See physical exam under H&P/Progress note      COMMENTS:    The patient is a suitable candidate for the planned procedure unless box checked [ ]  No, explain:

## 2023-09-29 ENCOUNTER — OUTPATIENT (OUTPATIENT)
Dept: OUTPATIENT SERVICES | Facility: HOSPITAL | Age: 48
LOS: 1 days | End: 2023-09-29
Payer: COMMERCIAL

## 2023-09-29 ENCOUNTER — RESULT REVIEW (OUTPATIENT)
Age: 48
End: 2023-09-29

## 2023-09-29 ENCOUNTER — APPOINTMENT (OUTPATIENT)
Dept: GASTROENTEROLOGY | Facility: HOSPITAL | Age: 48
End: 2023-09-29

## 2023-09-29 ENCOUNTER — TRANSCRIPTION ENCOUNTER (OUTPATIENT)
Age: 48
End: 2023-09-29

## 2023-09-29 VITALS
TEMPERATURE: 98 F | RESPIRATION RATE: 16 BRPM | WEIGHT: 141.98 LBS | OXYGEN SATURATION: 100 % | DIASTOLIC BLOOD PRESSURE: 68 MMHG | SYSTOLIC BLOOD PRESSURE: 127 MMHG | HEART RATE: 52 BPM | HEIGHT: 62 IN

## 2023-09-29 VITALS
HEART RATE: 50 BPM | OXYGEN SATURATION: 97 % | DIASTOLIC BLOOD PRESSURE: 74 MMHG | RESPIRATION RATE: 15 BRPM | SYSTOLIC BLOOD PRESSURE: 113 MMHG

## 2023-09-29 DIAGNOSIS — Z12.11 ENCOUNTER FOR SCREENING FOR MALIGNANT NEOPLASM OF COLON: ICD-10-CM

## 2023-09-29 DIAGNOSIS — Z90.49 ACQUIRED ABSENCE OF OTHER SPECIFIED PARTS OF DIGESTIVE TRACT: Chronic | ICD-10-CM

## 2023-09-29 PROCEDURE — 88305 TISSUE EXAM BY PATHOLOGIST: CPT

## 2023-09-29 PROCEDURE — 88305 TISSUE EXAM BY PATHOLOGIST: CPT | Mod: 26

## 2023-09-29 PROCEDURE — 45380 COLONOSCOPY AND BIOPSY: CPT | Mod: GC

## 2023-09-29 PROCEDURE — 45380 COLONOSCOPY AND BIOPSY: CPT | Mod: PT

## 2023-09-29 DEVICE — NET RETRV ROT ROTH 2.5MMX230CM: Type: IMPLANTABLE DEVICE | Status: FUNCTIONAL

## 2023-09-29 RX ORDER — METOPROLOL TARTRATE 50 MG
1 TABLET ORAL
Refills: 0 | DISCHARGE

## 2023-09-29 RX ORDER — HYDROCHLOROTHIAZIDE 25 MG
1 TABLET ORAL
Refills: 0 | DISCHARGE

## 2023-09-29 RX ORDER — SODIUM CHLORIDE 9 MG/ML
500 INJECTION INTRAMUSCULAR; INTRAVENOUS; SUBCUTANEOUS
Refills: 0 | Status: DISCONTINUED | OUTPATIENT
Start: 2023-09-29 | End: 2023-10-13

## 2023-09-29 NOTE — ASU PREOP CHECKLIST - WARM FLUIDS/WARM BLANKETS
Physical Therapy Daily Treatment    Visit Count: 16  Plan of Care: 8/29/2019 Through: 11/21/2019  Insurance Information:   Payor: [facility]  Authorization Needed: yes with [facility] [employer] after visit 12  Maximum Visit Limit Per Year: based on Medical necessity  Referred by: Juan Gooden PA-C; Next provider visit (if known/scheduled): 10/10/19  Medical Diagnosis (from order):       Diagnosis Information             Diagnosis      V58.89, 845.09 (ICD-9-CM) - S86.011D (ICD-10-CM) - Rupture of right Achilles tendon, subsequent encounter         Date of Surgery: 7/17/19; Surgery performed: right Achilles repair ; Physician Guidelines yes  Diagnosis Precautions: As tolerated     SUBJECTIVE   Things going ok. Just have a burning on the bottom of my right foot.    Current Pain (0-10 scale): 0  Functional Change: Had to walk up 4 flights of steps.    OBJECTIVE   Gait: Continued decrease of toe off on the right     Treatment   Therapeutic Exercise:   Recumbent Bike seat@10 level 1 8 minutes - subjective taken     Eccentric heel raises 2 x 20 reps   Single leg heel raise in // bars x 10 reps     Tandem stance with arm swings with alternate lead 1 minute each x 2  Sub max alternate forward lunge with trunk rotation 2 x 10 bilateral  Sub max forward lunge with opposite side over head reach of UE 2 x 10 each  Lateral sub max lunge with side bend to same side 2 x 10 each   *Cues needed for toe touch throughout  Pawing with L side x10  *Cues needed for toe touch throughout     3 way balance reach matrix.       Manual Therapy:   IASTIM right gastroc   Soft tissue mobilization of the gastroc and achilles  Achilles clearing     Skilled input: verbal instruction/cues, tactile instruction/cues, facilitation, education/instruction on HEP, ambulation instruction/cues for weight bearing and heel toe gait     Home Program:   Squatting at ballet bar   Weight shifting  -Forward and back  -Lateral  Ankle pumps   Ankle ABC  Ankle PF with black band    Eccentric heel raise  Standing gastroc and soleus stretch   Functional stretching:  Forward walking lunges with toe kick  Lateral walking lunges  Airplanes        Writer verbally educated the patient and received verbal consent from the patient on hand placement, positioning of patient, and techniques to be performed today including therapist position for techniques as described above and how they are pertinent to the patient's plan of care.      Suggestions for next session as indicated: progress per plan of care further strength, stretching and balance training.   Revisit gait pattern next time     ASSESSMENT   Pt with imprving function and tolerance for exercises, however, she has difficulty keeping her foot in complete contact with floor which required cues as she tended to brigette in DF. Thus feel pt could benefit from continued dynamic strength and balance progression.    Pain after treatment (patient reported, 0-10 scale): 0 Looser.  Result of above outlined education: Verbalizes understanding    THERAPY DAILY BILLING   Insurance: [employer]. N/A    Evaluation Procedures:  No evaluation codes were used on this date of service    Timed Procedures:  Manual Therapy, 25 minutes  Therapeutic Exercise, 20 minutes    Untimed Procedures:  No untimed codes were used on this date of service    Total Treatment Time: 45 minutes   no

## 2023-09-29 NOTE — PRE-ANESTHESIA EVALUATION ADULT - NSANTHGENDERRD_ENT_A_CORE
· Patient had a CT calcium score completed in April 2021.  · Calcium score was 72 in the LAD  · Patient was following with Dr. Blount in the past and has been on aspirin and atorvastatin 10 mg  · Continue current therapy   · Patient also had a exercise stress test completed 5/20/2021 and patient walked 10 minutes without any ischemic changes.  · Educated patient on healthy diet   No

## 2023-10-03 LAB — SURGICAL PATHOLOGY STUDY: SIGNIFICANT CHANGE UP

## 2023-10-05 ENCOUNTER — NON-APPOINTMENT (OUTPATIENT)
Age: 48
End: 2023-10-05

## 2023-11-07 ENCOUNTER — APPOINTMENT (OUTPATIENT)
Dept: GASTROENTEROLOGY | Facility: CLINIC | Age: 48
End: 2023-11-07
Payer: COMMERCIAL

## 2023-11-07 VITALS
HEART RATE: 56 BPM | BODY MASS INDEX: 26.81 KG/M2 | SYSTOLIC BLOOD PRESSURE: 125 MMHG | DIASTOLIC BLOOD PRESSURE: 80 MMHG | WEIGHT: 142 LBS | HEIGHT: 61 IN | OXYGEN SATURATION: 98 %

## 2023-11-07 DIAGNOSIS — R10.9 UNSPECIFIED ABDOMINAL PAIN: ICD-10-CM

## 2023-11-07 DIAGNOSIS — R19.8 OTHER SPECIFIED SYMPTOMS AND SIGNS INVOLVING THE DIGESTIVE SYSTEM AND ABDOMEN: ICD-10-CM

## 2023-11-07 DIAGNOSIS — K76.0 FATTY (CHANGE OF) LIVER, NOT ELSEWHERE CLASSIFIED: ICD-10-CM

## 2023-11-07 PROCEDURE — 99214 OFFICE O/P EST MOD 30 MIN: CPT

## 2023-11-07 RX ORDER — MELOXICAM 7.5 MG/1
7.5 TABLET ORAL DAILY
Qty: 10 | Refills: 0 | Status: DISCONTINUED | COMMUNITY
Start: 2020-10-23 | End: 2023-11-07

## 2023-11-08 PROBLEM — R19.8 ABNORMAL BOWEL MOVEMENT: Status: ACTIVE | Noted: 2023-08-28

## 2023-11-08 PROBLEM — R10.9 ABDOMINAL PAIN: Status: ACTIVE | Noted: 2023-04-26

## 2023-11-14 ENCOUNTER — APPOINTMENT (OUTPATIENT)
Dept: HEPATOLOGY | Facility: CLINIC | Age: 48
End: 2023-11-14

## 2024-01-16 NOTE — ED ADULT TRIAGE NOTE - CHIEF COMPLAINT QUOTE
Patient having left flank pain since friday. patient was prescribed flomax and cipro 500. patient continues to have some pain to left flank <-- Click to add NO significant Past Surgical History

## 2024-09-30 ENCOUNTER — RESULT REVIEW (OUTPATIENT)
Age: 49
End: 2024-09-30

## 2024-09-30 ENCOUNTER — APPOINTMENT (OUTPATIENT)
Dept: GYNECOLOGIC ONCOLOGY | Facility: CLINIC | Age: 49
End: 2024-09-30
Payer: COMMERCIAL

## 2024-09-30 VITALS
WEIGHT: 134.2 LBS | DIASTOLIC BLOOD PRESSURE: 76 MMHG | OXYGEN SATURATION: 98 % | HEIGHT: 61 IN | TEMPERATURE: 98.6 F | SYSTOLIC BLOOD PRESSURE: 144 MMHG | HEART RATE: 60 BPM | BODY MASS INDEX: 25.34 KG/M2

## 2024-09-30 DIAGNOSIS — D21.9 BENIGN NEOPLASM OF CONNECTIVE AND OTHER SOFT TISSUE, UNSPECIFIED: ICD-10-CM

## 2024-09-30 DIAGNOSIS — K76.0 FATTY (CHANGE OF) LIVER, NOT ELSEWHERE CLASSIFIED: ICD-10-CM

## 2024-09-30 PROCEDURE — 99459 PELVIC EXAMINATION: CPT

## 2024-09-30 PROCEDURE — 99214 OFFICE O/P EST MOD 30 MIN: CPT

## 2024-09-30 NOTE — PHYSICAL EXAM
[Chaperone Present] : A chaperone was present in the examining room during all aspects of the physical examination [Absent] : CVAT: absent [Abnormal] : Bimanual Exam: Abnormal [Fully active, able to carry on all pre-disease performance without restriction] : Status 0 - Fully active, able to carry on all pre-disease performance without restriction [13210] : A chaperone was present during the pelvic exam. [Normal] : Recto-Vaginal Exam: Normal [de-identified] : Trace edema [de-identified] : periumb scar [de-identified] : No peritoneal signs; mild right pelvic and right upper quadrant discomfort; no guarding [de-identified] : No palpable hernia even with Valsalva [de-identified] : No blood or discharge seen.  [de-identified] : No CMT [de-identified] : The uterus was enlarged to 3-4 FB above the pubis [de-identified] : The adnexa were nonpalpable with mild discomfort on the right [de-identified] : There was fullness above the pubis into the right lower abdomen

## 2024-09-30 NOTE — HISTORY OF PRESENT ILLNESS
[FreeTextEntry1] : Referring GYN - Dr. Elzbieta Webb PCP - Dr. Jenaro Temple GI - none  HPI: Ms. Colon presents for follow up. She has not seen her Gyn or had other assessments, having put her work ahead of herself she says. She is concerned that she may be losing her job. She reports continued and perhaps increased right abdominal discomfort. We disc her GI eval, incl the c-scope results.   Hx: She is a 49 yo  premenopausal female (LMP 6/23/23) who presents for initial consultation at the request of Dr. Webb for the management of a hydrosalpinx. She was seen for annual GYN exam on 4/26/23 at which time she reported intermittent pelvic pain. Imaging was consistent with a right adnexal hydrosalpinx and a clear cyst increased in size since 5/2022. Tumor markers were normal. She was referred here for further management.   She notes vague right sided pelvic discomfort. For about a month she also notes RUQ and right flank discomfort; she notes this has not been disc with her providers. She has been advised to get a c-scope. 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   Disc FHx.   She subseq reports for last 2 yrs in summer, Jun or Jul she experienced 3-4 wks of bleeding. Since last Jun menses q4-6w and about 4 d. She reports no prior biopsy of EM.   LABS on 5/31/23: CA-125 was 8 (ref <38) CEA was 0.6 (ref <3.8) Premenopausal DORY was 0.52 (ref<1.14) DORY CA-125 as 7.6 (ref< 38.1) OVA-1 was 3.2 (ref<4.4)   IMAGING: CT A/P on 8/25/23: LOWER CHEST: Within normal limits. LIVER: Hepatic steatosis.. Hepatomegaly, measuring 18 cm. BILE DUCTS: Normal caliber. GALLBLADDER: Within normal limits. SPLEEN: Within normal limits. PANCREAS: Within normal limits. ADRENALS: Within normal limits. KIDNEYS/URETERS: Within normal limits. VISUALIZED PORTIONS: BOWEL: Within normal limits. PERITONEUM: No ascites. VESSELS: Within normal limits. RETROPERITONEUM/LYMPH NODES: No lymphadenopathy. ABDOMINAL WALL: Small fat-containing umbilical hernia. BONES: Within normal limits. Additional: Cystic type lesions in the presacral space superiorly partially visualized measuring 4.1 x 3.4 cm may represent right ovarian cyst or hydrosalpinx, likely corresponding to previously noted hydrosalpinx on MRI of 6/8/2023. IMPRESSION: Hepatomegaly with hepatic steatosis. Partially visualized cystic hypodensities in the superior presacral space likely corresponding to previously noted hydrosalpinx and/or right ovarian cyst seen on MRI pelvis 6/8/2023. Pelvic ultrasound can be obtained as indicated.   Pelvic MRI on 6/8/23: UTERUS: Measures 11.5 x 6.4 x 6.0 cm. Posterior fundal intramural fibroid measuring 5.1 x 4.5 x 4.8 cm. Right posterior body subserosal fibroid measuring 2.0 x 1.2 x 1.6 cm. Nabothian cysts. ENDOMETRIUM: Within normal limits. Measures up to 4 mm in thickness. JUNCTIONAL ZONE: Within normal limits. RIGHT OVARY: Measures 3.8 x 1.8 x 2.0 cm. Simple appearing right ovarian cyst measuring 2.8 x 1.5 x 1.7 cm. LEFT OVARY: Measures 2.2 x 1.2 x 1.6 cm. Within normal limits. ADNEXA: Fluid-filled tubular structure within the right adnexa measuring up to 3.0 cm in diameter and approximately 12 cm in length, consistent with hydrosalpinx. BLADDER: Within normal limits. LYMPH NODES: No pelvic lymphadenopathy. VISUALIZED PORTIONS: ABDOMINAL ORGANS: Within normal limits. BOWEL: Within normal limits. PERITONEUM: Trace fluid in the right adnexa. VESSELS: Within normal limits. ABDOMINAL WALL: Within normal limits. BONES: Degenerative changes. THIGHS: Partially imaged intramuscular fatty lesion within the left quadriceps, most likely representing a lipoma. IMPRESSION: Fluid-filled tubular structure within the right adnexa, compatible with hydrosalpinx. Simple appearing right ovarian cyst measuring 2.8 cm. Uterine fibroids. Trace fluid within the right adnexa.   TVUS on 5/16/22: AV uterus 6.84 x 6.43 x 4.63 cm. Myoma 5.94 x 5.4 x 4.47 cm. EML 6.06 mm RTO - 2.68 x 2.17 x 1.69 cm. Clear cyst 2.0 cm. Right adnexal fluid elongated collection adjacent to the right ovary 4.6 cm.  LTO - 2.58 x 2.03 x 1.56 cm  TVUS on 5/27/23: AV uterus 6.22 x 6.06 x 5.03 cm. Myoma 5.89 x 5.61 x 4.5 cm. EML 2.86 mm.  RTO - 4.05 x 2.0 x 2.68 cm. Clear cyst 3.1 cm. Right adnexal fluid elongated collection seen adjacent to the ovary 7.2 cm, previously 4.6 cm on 5/16/22 LTO - 1.76 x 1.21 x 1.16 cm.    TVUS on 9/11/20: uterus 11.7 x 6.7 x 7.3 cm. Evidence of a 6.1 x 5.6 x 5.7 cm sized posterior intramural fibroid with secondary distortion of the endometrium. 2 cm nabothian cyst. EML 9 mm.  RTO - 2.4 x 1.7 x 1.9 cm. LTO - 2.0 x 1.4 x 1.7 cm No FF.   PMHx: HTN PSHx: laparoscopic appendectomy Fam Hx: PGM (ovarian cancer)   HCM: PAP on 4/26/23: NEGATIVE, HrHPV negative Mammogram: 4/2022 - BIRADS 1 CBE: 4/2023 [facility] SBE: performs Colonoscopy: never  COVID-19 vaccine series completed

## 2024-09-30 NOTE — PHYSICAL EXAM
[Chaperone Present] : A chaperone was present in the examining room during all aspects of the physical examination [Absent] : CVAT: absent [Abnormal] : Bimanual Exam: Abnormal [Fully active, able to carry on all pre-disease performance without restriction] : Status 0 - Fully active, able to carry on all pre-disease performance without restriction [[id number]] : A chaperone was present during the pelvic exam. [Normal] : Recto-Vaginal Exam: Normal [de-identified] : Trace edema [de-identified] : periumb scar [de-identified] : No peritoneal signs; mild right pelvic and right upper quadrant discomfort; no guarding [de-identified] : No palpable hernia even with Valsalva [de-identified] : No blood or discharge seen.  [de-identified] : No CMT [de-identified] : The uterus was enlarged to 3-4 FB above the pubis [de-identified] : The adnexa were nonpalpable with mild discomfort on the right [de-identified] : There was fullness above the pubis into the right lower abdomen

## 2024-09-30 NOTE — DISCUSSION/SUMMARY
[Reviewed Clinical Lab Test(s)] : Results of clinical tests were reviewed. [Reviewed Radiology Report(s)] : Radiology reports were reviewed. [FreeTextEntry1] : -I met with the pt.  -We discussed the prior clinical findings and the GI eval, reviewing the plan of care items in detail.  -I strongly encouraged her to review the GI eval and recs with her PCP. She said that labs by the PCP were "off". I also asked her to have them sent to me form review.  -We await the Pap/HPV from today. She notes regular menses ~q5-6wks.   -We again reviewed the notion of surg intervention, which she would like to pursue, part given her concerns for coverage changes. We disc a reassessment with a CT A/P, and an Rx was provided. I ahve advised an open procedure, which I would again pursue if surgery planned. We again noted the apparent umb hernia, though not appreciated on exam today.  -Periop and recuperative issues has been discussed, as were the possible hazards and risks of surgery, including but not limited to infection, thromboembolic disease and its life-threatening nature, transfusion, and surrounding organ injury (urinary, bowel, vascular, and neural), incision issues. -The diagram was referred to.  -Her instructions were reviewed. -All Q/A.  -She will set up a f/u disc after the CT to review the results and ther dispo.  -Letter TF. -Effort for the visit includes the note prep, review of prior material, interview, exam, further documentation, and coordination of care.

## 2024-10-01 ENCOUNTER — APPOINTMENT (OUTPATIENT)
Dept: CT IMAGING | Facility: HOSPITAL | Age: 49
End: 2024-10-01
Payer: COMMERCIAL

## 2024-10-01 ENCOUNTER — OUTPATIENT (OUTPATIENT)
Dept: OUTPATIENT SERVICES | Facility: HOSPITAL | Age: 49
LOS: 1 days | End: 2024-10-01
Payer: COMMERCIAL

## 2024-10-01 DIAGNOSIS — N94.89 OTHER SPECIFIED CONDITIONS ASSOCIATED WITH FEMALE GENITAL ORGANS AND MENSTRUAL CYCLE: ICD-10-CM

## 2024-10-01 PROCEDURE — 74177 CT ABD & PELVIS W/CONTRAST: CPT

## 2024-10-01 PROCEDURE — 74177 CT ABD & PELVIS W/CONTRAST: CPT | Mod: 26

## 2024-10-02 LAB — HPV HIGH+LOW RISK DNA PNL CVX: NOT DETECTED

## 2024-10-04 ENCOUNTER — APPOINTMENT (OUTPATIENT)
Dept: GYNECOLOGIC ONCOLOGY | Facility: CLINIC | Age: 49
End: 2024-10-04
Payer: COMMERCIAL

## 2024-10-04 DIAGNOSIS — N94.89 OTHER SPECIFIED CONDITIONS ASSOCIATED WITH FEMALE GENITAL ORGANS AND MENSTRUAL CYCLE: ICD-10-CM

## 2024-10-04 DIAGNOSIS — N70.11 CHRONIC SALPINGITIS: ICD-10-CM

## 2024-10-04 DIAGNOSIS — R10.9 UNSPECIFIED ABDOMINAL PAIN: ICD-10-CM

## 2024-10-04 DIAGNOSIS — K42.9 UMBILICAL HERNIA W/OUT OBSTRUCTION OR GANGRENE: ICD-10-CM

## 2024-10-04 PROCEDURE — 99214 OFFICE O/P EST MOD 30 MIN: CPT

## 2024-10-07 DIAGNOSIS — B96.89 ACUTE VAGINITIS: ICD-10-CM

## 2024-10-07 DIAGNOSIS — N76.0 ACUTE VAGINITIS: ICD-10-CM

## 2024-10-07 LAB — CYTOLOGY CVX/VAG DOC THIN PREP: ABNORMAL

## 2024-10-07 RX ORDER — METRONIDAZOLE 7.5 MG/G
0.75 GEL VAGINAL
Qty: 5 | Refills: 0 | Status: ACTIVE | COMMUNITY
Start: 2024-10-07 | End: 1900-01-01

## 2024-10-07 NOTE — REASON FOR VISIT
[FreeTextEntry1] : Follow up [Home] : at home, [unfilled] , at the time of the visit. [Medical Office: ([facility])___] : at the medical office located in  [Patient] : the patient [Self] : self

## 2024-10-07 NOTE — HISTORY OF PRESENT ILLNESS
[FreeTextEntry1] : Referring GYN - Dr. Elzbieta Webb PCP - Dr. Jenaro Temple GI - none  HPI: She presents for f/u disc. s/p CT. Rec'd labs (9p) from PCP, Cards, incl CBC, CMP, and markers, which were ml ( trend stable in nl range). Reviewed CT with reading attd Rad.   Recent Hx: Ms. Colon presents for follow up. She has not seen her Gyn or had other assessments, having put her work ahead of herself she says. She is concerned that she may be losing her job. She reports continued and perhaps increased right abdominal discomfort. We disc her GI eval, incl the c-scope results.   Hx: She is a 47 yo  premenopausal female (LMP 6/23/23) who presents for initial consultation at the request of Dr. Webb for the management of a hydrosalpinx. She was seen for annual GYN exam on 4/26/23 at which time she reported intermittent pelvic pain. Imaging was consistent with a right adnexal hydrosalpinx and a clear cyst increased in size since 5/2022. Tumor markers were normal. She was referred here for further management.   She notes vague right sided pelvic discomfort. For about a month she also notes RUQ and right flank discomfort; she notes this has not been disc with her providers. She has been advised to get a c-scope. 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   Disc FHx.   She subseq reports for last 2 yrs in summer, Jun or Jul she experienced 3-4 wks of bleeding. Since last Jun menses q4-6w and about 4 d. She reports no prior biopsy of EM.   LABS on 5/31/23: CA-125 was 8 (ref <38) CEA was 0.6 (ref <3.8) Premenopausal DORY was 0.52 (ref<1.14) DORY CA-125 as 7.6 (ref< 38.1) OVA-1 was 3.2 (ref<4.4)   IMAGING: CT A/P on 8/25/23: LOWER CHEST: Within normal limits. LIVER: Hepatic steatosis.. Hepatomegaly, measuring 18 cm. BILE DUCTS: Normal caliber. GALLBLADDER: Within normal limits. SPLEEN: Within normal limits. PANCREAS: Within normal limits. ADRENALS: Within normal limits. KIDNEYS/URETERS: Within normal limits. VISUALIZED PORTIONS: BOWEL: Within normal limits. PERITONEUM: No ascites. VESSELS: Within normal limits. RETROPERITONEUM/LYMPH NODES: No lymphadenopathy. ABDOMINAL WALL: Small fat-containing umbilical hernia. BONES: Within normal limits. Additional: Cystic type lesions in the presacral space superiorly partially visualized measuring 4.1 x 3.4 cm may represent right ovarian cyst or hydrosalpinx, likely corresponding to previously noted hydrosalpinx on MRI of 6/8/2023. IMPRESSION: Hepatomegaly with hepatic steatosis. Partially visualized cystic hypodensities in the superior presacral space likely corresponding to previously noted hydrosalpinx and/or right ovarian cyst seen on MRI pelvis 6/8/2023. Pelvic ultrasound can be obtained as indicated.   Pelvic MRI on 6/8/23: UTERUS: Measures 11.5 x 6.4 x 6.0 cm. Posterior fundal intramural fibroid measuring 5.1 x 4.5 x 4.8 cm. Right posterior body subserosal fibroid measuring 2.0 x 1.2 x 1.6 cm. Nabothian cysts. ENDOMETRIUM: Within normal limits. Measures up to 4 mm in thickness. JUNCTIONAL ZONE: Within normal limits. RIGHT OVARY: Measures 3.8 x 1.8 x 2.0 cm. Simple appearing right ovarian cyst measuring 2.8 x 1.5 x 1.7 cm. LEFT OVARY: Measures 2.2 x 1.2 x 1.6 cm. Within normal limits. ADNEXA: Fluid-filled tubular structure within the right adnexa measuring up to 3.0 cm in diameter and approximately 12 cm in length, consistent with hydrosalpinx. BLADDER: Within normal limits. LYMPH NODES: No pelvic lymphadenopathy. VISUALIZED PORTIONS: ABDOMINAL ORGANS: Within normal limits. BOWEL: Within normal limits. PERITONEUM: Trace fluid in the right adnexa. VESSELS: Within normal limits. ABDOMINAL WALL: Within normal limits. BONES: Degenerative changes. THIGHS: Partially imaged intramuscular fatty lesion within the left quadriceps, most likely representing a lipoma. IMPRESSION: Fluid-filled tubular structure within the right adnexa, compatible with hydrosalpinx. Simple appearing right ovarian cyst measuring 2.8 cm. Uterine fibroids. Trace fluid within the right adnexa.   TVUS on 5/16/22: AV uterus 6.84 x 6.43 x 4.63 cm. Myoma 5.94 x 5.4 x 4.47 cm. EML 6.06 mm RTO - 2.68 x 2.17 x 1.69 cm. Clear cyst 2.0 cm. Right adnexal fluid elongated collection adjacent to the right ovary 4.6 cm.  LTO - 2.58 x 2.03 x 1.56 cm  TVUS on 5/27/23: AV uterus 6.22 x 6.06 x 5.03 cm. Myoma 5.89 x 5.61 x 4.5 cm. EML 2.86 mm.  RTO - 4.05 x 2.0 x 2.68 cm. Clear cyst 3.1 cm. Right adnexal fluid elongated collection seen adjacent to the ovary 7.2 cm, previously 4.6 cm on 5/16/22 LTO - 1.76 x 1.21 x 1.16 cm.    TVUS on 9/11/20: uterus 11.7 x 6.7 x 7.3 cm. Evidence of a 6.1 x 5.6 x 5.7 cm sized posterior intramural fibroid with secondary distortion of the endometrium. 2 cm nabothian cyst. EML 9 mm.  RTO - 2.4 x 1.7 x 1.9 cm. LTO - 2.0 x 1.4 x 1.7 cm No FF.   PMHx: HTN PSHx: laparoscopic appendectomy Fam Hx: PGM (ovarian cancer)   HCM: PAP on 4/26/23: NEGATIVE, HrHPV negative Mammogram: 4/2022 - BIRADS 1 CBE: 4/2023 [facility] SBE: performs Colonoscopy: never  COVID-19 vaccine series completed

## 2024-10-07 NOTE — HISTORY OF PRESENT ILLNESS
[FreeTextEntry1] : Referring GYN - Dr. Elzbieta Webb PCP - Dr. Jenaro Temple GI - none  HPI: She presents for f/u disc. s/p CT. Rec'd labs (9p) from PCP, Cards, incl CBC, CMP, and markers, which were ml ( trend stable in nl range). Reviewed CT with reading attd Rad.   Recent Hx: Ms. Colon presents for follow up. She has not seen her Gyn or had other assessments, having put her work ahead of herself she says. She is concerned that she may be losing her job. She reports continued and perhaps increased right abdominal discomfort. We disc her GI eval, incl the c-scope results.   Hx: She is a 49 yo  premenopausal female (LMP 6/23/23) who presents for initial consultation at the request of Dr. Webb for the management of a hydrosalpinx. She was seen for annual GYN exam on 4/26/23 at which time she reported intermittent pelvic pain. Imaging was consistent with a right adnexal hydrosalpinx and a clear cyst increased in size since 5/2022. Tumor markers were normal. She was referred here for further management.   She notes vague right sided pelvic discomfort. For about a month she also notes RUQ and right flank discomfort; she notes this has not been disc with her providers. She has been advised to get a c-scope. 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   Disc FHx.   She subseq reports for last 2 yrs in summer, Jun or Jul she experienced 3-4 wks of bleeding. Since last Jun menses q4-6w and about 4 d. She reports no prior biopsy of EM.   LABS on 5/31/23: CA-125 was 8 (ref <38) CEA was 0.6 (ref <3.8) Premenopausal DORY was 0.52 (ref<1.14) DORY CA-125 as 7.6 (ref< 38.1) OVA-1 was 3.2 (ref<4.4)   IMAGING: CT A/P on 8/25/23: LOWER CHEST: Within normal limits. LIVER: Hepatic steatosis.. Hepatomegaly, measuring 18 cm. BILE DUCTS: Normal caliber. GALLBLADDER: Within normal limits. SPLEEN: Within normal limits. PANCREAS: Within normal limits. ADRENALS: Within normal limits. KIDNEYS/URETERS: Within normal limits. VISUALIZED PORTIONS: BOWEL: Within normal limits. PERITONEUM: No ascites. VESSELS: Within normal limits. RETROPERITONEUM/LYMPH NODES: No lymphadenopathy. ABDOMINAL WALL: Small fat-containing umbilical hernia. BONES: Within normal limits. Additional: Cystic type lesions in the presacral space superiorly partially visualized measuring 4.1 x 3.4 cm may represent right ovarian cyst or hydrosalpinx, likely corresponding to previously noted hydrosalpinx on MRI of 6/8/2023. IMPRESSION: Hepatomegaly with hepatic steatosis. Partially visualized cystic hypodensities in the superior presacral space likely corresponding to previously noted hydrosalpinx and/or right ovarian cyst seen on MRI pelvis 6/8/2023. Pelvic ultrasound can be obtained as indicated.   Pelvic MRI on 6/8/23: UTERUS: Measures 11.5 x 6.4 x 6.0 cm. Posterior fundal intramural fibroid measuring 5.1 x 4.5 x 4.8 cm. Right posterior body subserosal fibroid measuring 2.0 x 1.2 x 1.6 cm. Nabothian cysts. ENDOMETRIUM: Within normal limits. Measures up to 4 mm in thickness. JUNCTIONAL ZONE: Within normal limits. RIGHT OVARY: Measures 3.8 x 1.8 x 2.0 cm. Simple appearing right ovarian cyst measuring 2.8 x 1.5 x 1.7 cm. LEFT OVARY: Measures 2.2 x 1.2 x 1.6 cm. Within normal limits. ADNEXA: Fluid-filled tubular structure within the right adnexa measuring up to 3.0 cm in diameter and approximately 12 cm in length, consistent with hydrosalpinx. BLADDER: Within normal limits. LYMPH NODES: No pelvic lymphadenopathy. VISUALIZED PORTIONS: ABDOMINAL ORGANS: Within normal limits. BOWEL: Within normal limits. PERITONEUM: Trace fluid in the right adnexa. VESSELS: Within normal limits. ABDOMINAL WALL: Within normal limits. BONES: Degenerative changes. THIGHS: Partially imaged intramuscular fatty lesion within the left quadriceps, most likely representing a lipoma. IMPRESSION: Fluid-filled tubular structure within the right adnexa, compatible with hydrosalpinx. Simple appearing right ovarian cyst measuring 2.8 cm. Uterine fibroids. Trace fluid within the right adnexa.   TVUS on 5/16/22: AV uterus 6.84 x 6.43 x 4.63 cm. Myoma 5.94 x 5.4 x 4.47 cm. EML 6.06 mm RTO - 2.68 x 2.17 x 1.69 cm. Clear cyst 2.0 cm. Right adnexal fluid elongated collection adjacent to the right ovary 4.6 cm.  LTO - 2.58 x 2.03 x 1.56 cm  TVUS on 5/27/23: AV uterus 6.22 x 6.06 x 5.03 cm. Myoma 5.89 x 5.61 x 4.5 cm. EML 2.86 mm.  RTO - 4.05 x 2.0 x 2.68 cm. Clear cyst 3.1 cm. Right adnexal fluid elongated collection seen adjacent to the ovary 7.2 cm, previously 4.6 cm on 5/16/22 LTO - 1.76 x 1.21 x 1.16 cm.    TVUS on 9/11/20: uterus 11.7 x 6.7 x 7.3 cm. Evidence of a 6.1 x 5.6 x 5.7 cm sized posterior intramural fibroid with secondary distortion of the endometrium. 2 cm nabothian cyst. EML 9 mm.  RTO - 2.4 x 1.7 x 1.9 cm. LTO - 2.0 x 1.4 x 1.7 cm No FF.   PMHx: HTN PSHx: laparoscopic appendectomy Fam Hx: PGM (ovarian cancer)   HCM: PAP on 4/26/23: NEGATIVE, HrHPV negative Mammogram: 4/2022 - BIRADS 1 CBE: 4/2023 [facility] SBE: performs Colonoscopy: never  COVID-19 vaccine series completed

## 2024-10-07 NOTE — DISCUSSION/SUMMARY
[Reviewed Clinical Lab Test(s)] : Results of clinical tests were reviewed. [Reviewed Radiology Report(s)] : Radiology reports were reviewed. [FreeTextEntry1] : -I met with the [facility] platform (including time for documentation and coord of care.  -We discussed the prior clinical findings and recent imaging. We disc the comparison. We also disc the recent labs in detail, and in the context of the DDx. The radiologist measurements were noted, as was the sense of stability from prior (they note the challenge of aligning the diff imaging modalities.  -We disc the nature of myomas, which the radiologist feel appear typical.   -We have disc the GI eval. I suggested f/u, given the liver findings and the Apolinar on the labs (as well as the wt loss concern).   -We await the Pap, and disc the HPV as well as its implications.  -We also disc her prior appy, the poss of adhesions contributing to her discomfort, as well as the apparent umb hernia. We disc the role of a surg consult. I provided her with the contact for SS and sent him word re the pt.    -We again reviewed the notion of surg intervention, what it might entail. We disc poss of an initial L/S eval with poss Ex Lap (VI) pending the findings and planned resection (eg, RSO, poss LS vs LSO with poss ov conservation, poss hyst, poss staging).   -Her instructions were reviewed. -All Q/A.  -She said she will see SS and f/u with her GI as well. She said she'll consider her options and get back to me with her ther dispo.  -Effort for the visit includes the note prep, review of prior material, interview, exam, further documentation, and coordination of care.

## 2024-10-07 NOTE — REASON FOR VISIT
[FreeTextEntry1] : Follow up [Home] : at home, [unfilled] , at the time of the visit. [Medical Office: ([facility])___] : at the medical office located in  [Patient] : the patient [Self] : self

## 2024-10-07 NOTE — DISCUSSION/SUMMARY
[Reviewed Clinical Lab Test(s)] : Results of clinical tests were reviewed. [Reviewed Radiology Report(s)] : Radiology reports were reviewed. [FreeTextEntry1] : -I met with the pt re the [facility] (including time for documentation and coord of care.  -We discussed the prior clinical findings and recent imaging. We disc the comparison. We also disc the recent labs in detail, and in the context of the DDx. The radiologist measurements were noted, as was the sense of stability from prior (they note the challenge of aligning the diff imaging modalities.  -We disc the nature of myomas, which the radiologist feel appear typical.   -We have disc the GI eval. I suggested f/u, given the liver findings and the Apolinar on the labs (as well as the wt loss concern).   -We await the Pap, and disc the HPV as well as its implications.  -We also disc her prior appy, the poss of adhesions contributing to her discomfort, as well as the apparent umb hernia. We disc the role of a surg consult. I provided her with the contact for SS and sent him word re the pt.    -We again reviewed the notion of surg intervention, what it might entail. We disc poss of an initial L/S eval with poss Ex Lap (VI) pending the findings and planned resection (eg, RSO, poss LS vs LSO with poss ov conservation, poss hyst, poss staging).   -Her instructions were reviewed. -All Q/A.  -She said she will see SS and f/u with her GI as well. She said she'll consider her options and get back to me with her ther dispo.  -Effort for the visit includes the note prep, review of prior material, interview, exam, further documentation, and coordination of care.

## 2024-10-07 NOTE — DISCUSSION/SUMMARY
[Reviewed Clinical Lab Test(s)] : Results of clinical tests were reviewed. [Reviewed Radiology Report(s)] : Radiology reports were reviewed. [FreeTextEntry1] : -I met with the pt re the [facility] platform (including time for documentation and coord of care.  -We discussed the prior clinical findings and recent imaging. We disc the comparison. We also disc the recent labs in detail, and in the context of the DDx. The radiologist measurements were noted, as was the sense of stability from prior (they note the challenge of aligning the diff imaging modalities.  -We disc the nature of myomas, which the radiologist feel appear typical.   -We have disc the GI eval. I suggested f/u, given the liver findings and the Apolinar on the labs (as well as the wt loss concern).   -We await the Pap, and disc the HPV as well as its implications.  -We also disc her prior appy, the poss of adhesions contributing to her discomfort, as well as the apparent umb hernia. We disc the role of a surg consult. I provided her with the contact for SS and sent him word re the pt.    -We again reviewed the notion of surg intervention, what it might entail. We disc poss of an initial L/S eval with poss Ex Lap (VI) pending the findings and planned resection (eg, RSO, poss LS vs LSO with poss ov conservation, poss hyst, poss staging).   -Her instructions were reviewed. -All Q/A.  -She said she will see SS and f/u with her GI as well. She said she'll consider her options and get back to me with her ther dispo.  -Effort for the visit includes the note prep, review of prior material, interview, exam, further documentation, and coordination of care.

## 2024-10-07 NOTE — REASON FOR VISIT
[FreeTextEntry1] : Follow up [Home] : at home, [unfilled] , at the time of the visit. [Medical Office: ([facility])___] : at the medical office located in  [Patient] : the patient [Self] : self

## 2024-10-14 ENCOUNTER — APPOINTMENT (OUTPATIENT)
Dept: OBGYN | Facility: CLINIC | Age: 49
End: 2024-10-14
Payer: COMMERCIAL

## 2024-10-14 VITALS
WEIGHT: 132 LBS | DIASTOLIC BLOOD PRESSURE: 78 MMHG | SYSTOLIC BLOOD PRESSURE: 132 MMHG | BODY MASS INDEX: 24.92 KG/M2 | HEIGHT: 61 IN

## 2024-10-14 DIAGNOSIS — Z01.419 ENCOUNTER FOR GYNECOLOGICAL EXAMINATION (GENERAL) (ROUTINE) W/OUT ABNORMAL FINDINGS: ICD-10-CM

## 2024-10-14 DIAGNOSIS — Z11.3 ENCOUNTER FOR SCREENING FOR INFECTIONS WITH A PREDOMINANTLY SEXUAL MODE OF TRANSMISSION: ICD-10-CM

## 2024-10-14 DIAGNOSIS — L29.2 PRURITUS VULVAE: ICD-10-CM

## 2024-10-14 PROCEDURE — 36415 COLL VENOUS BLD VENIPUNCTURE: CPT

## 2024-10-14 PROCEDURE — 99459 PELVIC EXAMINATION: CPT

## 2024-10-14 PROCEDURE — 99396 PREV VISIT EST AGE 40-64: CPT

## 2024-10-14 RX ORDER — CLOTRIMAZOLE AND BETAMETHASONE DIPROPIONATE 10; .5 MG/G; MG/G
1-0.05 CREAM TOPICAL
Qty: 1 | Refills: 2 | Status: ACTIVE | COMMUNITY
Start: 2024-10-14 | End: 1900-01-01

## 2024-10-14 NOTE — ASU PREOP CHECKLIST - TEMPERATURE IN FAHRENHEIT (DEGREES F)
Received  [employer] from registered nurse, pt is established with Irene Ambrose. Writer forwarded [employer] to [employer] for scheduling.    97.7

## 2024-10-17 LAB
C TRACH RRNA SPEC QL NAA+PROBE: NOT DETECTED
HBV SURFACE AG SER QL: NONREACTIVE
HCV AB SER QL: NONREACTIVE
HCV S/CO RATIO: 0.09 S/CO
HIV1+2 AB SPEC QL IA.RAPID: NONREACTIVE
N GONORRHOEA RRNA SPEC QL NAA+PROBE: NOT DETECTED
SOURCE AMPLIFICATION: NORMAL
T PALLIDUM AB SER QL IA: NEGATIVE

## 2024-11-22 ENCOUNTER — APPOINTMENT (OUTPATIENT)
Dept: OBGYN | Facility: CLINIC | Age: 49
End: 2024-11-22
Payer: COMMERCIAL

## 2024-11-22 VITALS
DIASTOLIC BLOOD PRESSURE: 60 MMHG | HEIGHT: 61 IN | WEIGHT: 138 LBS | BODY MASS INDEX: 26.06 KG/M2 | SYSTOLIC BLOOD PRESSURE: 130 MMHG

## 2024-11-22 PROCEDURE — 99214 OFFICE O/P EST MOD 30 MIN: CPT

## 2024-11-25 PROBLEM — K76.0 FATTY (CHANGE OF) LIVER, NOT ELSEWHERE CLASSIFIED: Chronic | Status: ACTIVE | Noted: 2024-11-04

## 2024-12-03 ENCOUNTER — OUTPATIENT (OUTPATIENT)
Dept: OUTPATIENT SERVICES | Facility: HOSPITAL | Age: 49
LOS: 1 days | End: 2024-12-03
Payer: COMMERCIAL

## 2024-12-03 VITALS
HEART RATE: 49 BPM | SYSTOLIC BLOOD PRESSURE: 131 MMHG | HEIGHT: 61 IN | RESPIRATION RATE: 16 BRPM | OXYGEN SATURATION: 100 % | DIASTOLIC BLOOD PRESSURE: 82 MMHG | TEMPERATURE: 98 F | WEIGHT: 134.04 LBS

## 2024-12-03 DIAGNOSIS — Z01.818 ENCOUNTER FOR OTHER PREPROCEDURAL EXAMINATION: ICD-10-CM

## 2024-12-03 DIAGNOSIS — Z90.49 ACQUIRED ABSENCE OF OTHER SPECIFIED PARTS OF DIGESTIVE TRACT: Chronic | ICD-10-CM

## 2024-12-03 DIAGNOSIS — N70.11 CHRONIC SALPINGITIS: ICD-10-CM

## 2024-12-03 LAB
BLD GP AB SCN SERPL QL: NEGATIVE — SIGNIFICANT CHANGE UP
RH IG SCN BLD-IMP: POSITIVE — SIGNIFICANT CHANGE UP

## 2024-12-03 PROCEDURE — 86901 BLOOD TYPING SEROLOGIC RH(D): CPT

## 2024-12-03 PROCEDURE — 86850 RBC ANTIBODY SCREEN: CPT

## 2024-12-03 PROCEDURE — G0463: CPT

## 2024-12-03 PROCEDURE — 86900 BLOOD TYPING SEROLOGIC ABO: CPT

## 2024-12-03 RX ORDER — ESCITALOPRAM OXALATE 10 MG/1
1 TABLET, FILM COATED ORAL
Refills: 0 | DISCHARGE

## 2024-12-03 RX ORDER — PROPRANOLOL HYDROCHLORIDE 80 MG/1
1 CAPSULE, EXTENDED RELEASE ORAL
Refills: 0 | DISCHARGE

## 2024-12-03 RX ORDER — BUSPIRONE HCL 15 MG
1 TABLET ORAL
Refills: 0 | DISCHARGE

## 2024-12-03 NOTE — H&P PST ADULT - CARDIOVASCULAR
details… regular rate and rhythm/S1 S2 present/no murmur/no pedal edema regular rate and rhythm/S1 S2 present/no murmur/no pedal edema/bradycardia

## 2024-12-03 NOTE — H&P PST ADULT - ATTENDING COMMENTS
GYN Attg:  Pt seen and assessed. Pt consented for LSC RSO, LS. All questions answered.  PAOLA Long MD

## 2024-12-03 NOTE — H&P PST ADULT - NSICDXPASTMEDICALHX_GEN_ALL_CORE_FT
PAST MEDICAL HISTORY:  2019 novel coronavirus disease (COVID-19)     Abdominal pain     Anxiety     Hepatic steatosis     HTN (hypertension)     Hydrosalpinx     Kidney stones     Umbilical hernia     Uterine fibroid

## 2024-12-03 NOTE — H&P PST ADULT - RESPIRATORY
clear to auscultation bilaterally/no wheezes/airway patent/breath sounds equal/good air movement clear to auscultation bilaterally/no wheezes/airway patent/breath sounds equal/good air movement/respirations non-labored

## 2024-12-03 NOTE — H&P PST ADULT - ASSESSMENT
Writer is unable to view op report, however path results are ok to schedule f/u in 4-6 weeks. DASI score: 7.25   DASIactivity: walking/stairs/ exercise without cp/SOB   loose teeth or dentures: denies   airway mp2    CAPRINI SCORE    AGE RELATED RISK FACTORS                                                             [x ] Age 41-60 years                                            (1 Point)  [ ] Age: 61-74 years                                           (2 Points)                 [ ] Age= 75 years                                                (3 Points)             DISEASE RELATED RISK FACTORS                                                       [ ] Edema in the lower extremities                 (1 Point)                     [ ] Varicose veins                                               (1 Point)                                 [ ] BMI > 25 Kg/m2                                            (1 Point)                                  [ ] Serious infection (ie PNA)                            (1 Point)                     [ ] Lung disease ( COPD, Emphysema)            (1 Point)                                                                          [ ] Acute myocardial infarction                         (1 Point)                  [ ] Congestive heart failure (in the previous month)  (1 Point)         [ ] Inflammatory bowel disease                            (1 Point)                  [ ] Central venous access, PICC or Port               (2 points)       (within the last month)                                                                [ ] Stroke (in the previous month)                        (5 Points)    [ ] Previous or present malignancy                       (2 points)                                                                                                                                                         HEMATOLOGY RELATED FACTORS                                                         [ ] Prior episodes of VTE                                     (3 Points)                     [ ] Positive family history for VTE                      (3 Points)                  [ ] Prothrombin 43745 A                                     (3 Points)                     [ ] Factor V Leiden                                                (3 Points)                        [ ] Lupus anticoagulants                                      (3 Points)                                                           [ ] Anticardiolipin antibodies                              (3 Points)                                                       [ ] High homocysteine in the blood                   (3 Points)                                             [ ] Other congenital or acquired thrombophilia      (3 Points)                                                [ ] Heparin induced thrombocytopenia                  (3 Points)                                        MOBILITY RELATED FACTORS  [ ] Bed rest                                                         (1 Point)  [ ] Plaster cast                                                    (2 points)  [ ] Bed bound for more than 72 hours           (2 Points)    GENDER SPECIFIC FACTORS  [ ] Pregnancy or had a baby within the last month   (1 Point)  [ ] Post-partum < 6 weeks                                   (1 Point)  [ ] Hormonal therapy  or oral contraception   (1 Point)  [ ] History of pregnancy complications              (1 point)  [ ] Unexplained or recurrent              (1 Point)    OTHER RISK FACTORS                                           (1 Point)  [ ] BMI >40, smoking, diabetes requiring insulin, chemotherapy  blood transfusions and length of surgery over 2 hours    SURGERY RELATED RISK FACTORS  [ ]  Section within the last month     (1 Point)  [ ] Minor surgery                                                  (1 Point)  [ ] Arthroscopic surgery                                       (2 Points)  [ ] Planned major surgery lasting more            (2 Points)      than 45 minutes     [ ] Elective hip or knee joint replacement       (5 points)       surgery                                                TRAUMA RELATED RISK FACTORS  [ ] Fracture of the hip, pelvis, or leg                       (5 Points)  [ ] Spinal cord injury resulting in paralysis             (5 points)       (in the previous month)    [ ] Paralysis  (less than 1 month)                             (5 Points)  [ ] Multiple Trauma within 1 month                        (5 Points)    Total Score [        ]    Caprini Score 0-2: Low Risk, NO VTE prophylaxis required for most patients, encourage ambulation  Caprini Score 3-6: Moderate Risk , pharmacologic VTE prophylaxis is indicated for most patients (in the absence of contraindications)  Caprini Score Greater than or =7: High risk, pharmocologic VTE prophylaxis indicated for most patients (in the absence of contraindications)                               DASI score: 7.25   DASIactivity: walking/stairs/ exercise without cp/SOB   loose teeth or dentures: denies   airway mp2    CAPRINI SCORE    AGE RELATED RISK FACTORS                                                             [x ] Age 41-60 years                                            (1 Point)  [ ] Age: 61-74 years                                           (2 Points)                 [ ] Age= 75 years                                                (3 Points)             DISEASE RELATED RISK FACTORS                                                       [ ] Edema in the lower extremities                 (1 Point)                     [ ] Varicose veins                                               (1 Point)                                 [ x] BMI > 25 Kg/m2                                            (1 Point)                                  [ ] Serious infection (ie PNA)                            (1 Point)                     [ ] Lung disease ( COPD, Emphysema)            (1 Point)                                                                          [ ] Acute myocardial infarction                         (1 Point)                  [ ] Congestive heart failure (in the previous month)  (1 Point)         [ ] Inflammatory bowel disease                            (1 Point)                  [ ] Central venous access, PICC or Port               (2 points)       (within the last month)                                                                [ ] Stroke (in the previous month)                        (5 Points)    [ ] Previous or present malignancy                       (2 points)                                                                                                                                                         HEMATOLOGY RELATED FACTORS                                                         [ ] Prior episodes of VTE                                     (3 Points)                     [ ] Positive family history for VTE                      (3 Points)                  [ ] Prothrombin 96172 A                                     (3 Points)                     [ ] Factor V Leiden                                                (3 Points)                        [ ] Lupus anticoagulants                                      (3 Points)                                                           [ ] Anticardiolipin antibodies                              (3 Points)                                                       [ ] High homocysteine in the blood                   (3 Points)                                             [ ] Other congenital or acquired thrombophilia      (3 Points)                                                [ ] Heparin induced thrombocytopenia                  (3 Points)                                        MOBILITY RELATED FACTORS  [ ] Bed rest                                                         (1 Point)  [ ] Plaster cast                                                    (2 points)  [ ] Bed bound for more than 72 hours           (2 Points)    GENDER SPECIFIC FACTORS  [ ] Pregnancy or had a baby within the last month   (1 Point)  [ ] Post-partum < 6 weeks                                   (1 Point)  [ ] Hormonal therapy  or oral contraception   (1 Point)  [ ] History of pregnancy complications              (1 point)  [ ] Unexplained or recurrent              (1 Point)    OTHER RISK FACTORS                                           (1 Point)  [ ] BMI >40, smoking, diabetes requiring insulin, chemotherapy  blood transfusions and length of surgery over 2 hours    SURGERY RELATED RISK FACTORS  [ ]  Section within the last month     (1 Point)  [ ] Minor surgery                                                  (1 Point)  [ ] Arthroscopic surgery                                       (2 Points)  [ x] Planned major surgery lasting more            (2 Points)      than 45 minutes     [ ] Elective hip or knee joint replacement       (5 points)       surgery                                                TRAUMA RELATED RISK FACTORS  [ ] Fracture of the hip, pelvis, or leg                       (5 Points)  [ ] Spinal cord injury resulting in paralysis             (5 points)       (in the previous month)    [ ] Paralysis  (less than 1 month)                             (5 Points)  [ ] Multiple Trauma within 1 month                        (5 Points)    Total Score [    4    ]    Caprini Score 0-2: Low Risk, NO VTE prophylaxis required for most patients, encourage ambulation  Caprini Score 3-6: Moderate Risk , pharmacologic VTE prophylaxis is indicated for most patients (in the absence of contraindications)  Caprini Score Greater than or =7: High risk, pharmocologic VTE prophylaxis indicated for most patients (in the absence of contraindications)

## 2024-12-03 NOTE — H&P PST ADULT - HISTORY OF PRESENT ILLNESS
49 year old female  with PMH of HTN, hydrosalpinx /ER visit 2024 for abdominal pain planned for ERP, Laparoscopic right salpingo oophorectomy, laparoscopic left salpingectomy on 2024 w/ Dr. Long.   ?

## 2024-12-03 NOTE — H&P PST ADULT - PROBLEM SELECTOR PLAN 1
planned for ERP, Laparoscopic right salpingo oophorectomy, laparoscopic left salpingectomy on 12/12/2024 w/ Dr. Long.   [facility] labs send  preprocedure surgical scrub instructions discussed   continue metoprolol, HCTZ am of procedure

## 2024-12-05 ENCOUNTER — NON-APPOINTMENT (OUTPATIENT)
Age: 49
End: 2024-12-05

## 2024-12-12 ENCOUNTER — APPOINTMENT (OUTPATIENT)
Dept: OBGYN | Facility: HOSPITAL | Age: 49
End: 2024-12-12

## 2024-12-12 ENCOUNTER — TRANSCRIPTION ENCOUNTER (OUTPATIENT)
Age: 49
End: 2024-12-12

## 2024-12-12 ENCOUNTER — RESULT REVIEW (OUTPATIENT)
Age: 49
End: 2024-12-12

## 2024-12-12 ENCOUNTER — OUTPATIENT (OUTPATIENT)
Dept: INPATIENT UNIT | Facility: HOSPITAL | Age: 49
LOS: 1 days | End: 2024-12-12
Payer: COMMERCIAL

## 2024-12-12 VITALS
RESPIRATION RATE: 16 BRPM | SYSTOLIC BLOOD PRESSURE: 131 MMHG | TEMPERATURE: 98 F | HEART RATE: 52 BPM | DIASTOLIC BLOOD PRESSURE: 60 MMHG | OXYGEN SATURATION: 96 %

## 2024-12-12 VITALS
SYSTOLIC BLOOD PRESSURE: 156 MMHG | HEART RATE: 54 BPM | TEMPERATURE: 98 F | WEIGHT: 134.04 LBS | HEIGHT: 61 IN | RESPIRATION RATE: 16 BRPM | OXYGEN SATURATION: 100 % | DIASTOLIC BLOOD PRESSURE: 80 MMHG

## 2024-12-12 DIAGNOSIS — Z90.49 ACQUIRED ABSENCE OF OTHER SPECIFIED PARTS OF DIGESTIVE TRACT: Chronic | ICD-10-CM

## 2024-12-12 DIAGNOSIS — N70.11 CHRONIC SALPINGITIS: ICD-10-CM

## 2024-12-12 LAB
HCG UR QL: NEGATIVE — SIGNIFICANT CHANGE UP
RH IG SCN BLD-IMP: POSITIVE — SIGNIFICANT CHANGE UP

## 2024-12-12 PROCEDURE — 88307 TISSUE EXAM BY PATHOLOGIST: CPT

## 2024-12-12 PROCEDURE — 88307 TISSUE EXAM BY PATHOLOGIST: CPT | Mod: 26

## 2024-12-12 PROCEDURE — 81025 URINE PREGNANCY TEST: CPT

## 2024-12-12 PROCEDURE — 58720 REMOVAL OF OVARY/TUBE(S): CPT

## 2024-12-12 PROCEDURE — 88112 CYTOPATH CELL ENHANCE TECH: CPT | Mod: 26

## 2024-12-12 PROCEDURE — 88302 TISSUE EXAM BY PATHOLOGIST: CPT | Mod: 26

## 2024-12-12 PROCEDURE — 88302 TISSUE EXAM BY PATHOLOGIST: CPT

## 2024-12-12 PROCEDURE — C9399: CPT

## 2024-12-12 PROCEDURE — 58661 LAPAROSCOPY REMOVE ADNEXA: CPT | Mod: 50,22

## 2024-12-12 PROCEDURE — 88112 CYTOPATH CELL ENHANCE TECH: CPT

## 2024-12-12 DEVICE — INTERCEED 5 X 6" XL
Type: IMPLANTABLE DEVICE | Status: NON-FUNCTIONAL
Removed: 2024-12-12

## 2024-12-12 DEVICE — VISTASEAL FIBRIN HUMAN 4ML
Type: IMPLANTABLE DEVICE | Status: NON-FUNCTIONAL
Removed: 2024-12-12

## 2024-12-12 DEVICE — INTERCEED 3 X 4"
Type: IMPLANTABLE DEVICE | Status: NON-FUNCTIONAL
Removed: 2024-12-12

## 2024-12-12 RX ORDER — CHLORHEXIDINE GLUCONATE 1.2 MG/ML
1 RINSE ORAL ONCE
Refills: 0 | Status: DISCONTINUED | OUTPATIENT
Start: 2024-12-12 | End: 2024-12-12

## 2024-12-12 RX ORDER — SODIUM CHLORIDE 9 MG/ML
3 INJECTION, SOLUTION INTRAMUSCULAR; INTRAVENOUS; SUBCUTANEOUS EVERY 8 HOURS
Refills: 0 | Status: DISCONTINUED | OUTPATIENT
Start: 2024-12-12 | End: 2024-12-12

## 2024-12-12 RX ORDER — OXYCODONE HYDROCHLORIDE 30 MG/1
1 TABLET ORAL
Qty: 5 | Refills: 0
Start: 2024-12-12 | End: 2024-12-13

## 2024-12-12 RX ORDER — OXYCODONE HYDROCHLORIDE 30 MG/1
1 TABLET ORAL
Qty: 8 | Refills: 0
Start: 2024-12-12

## 2024-12-12 RX ORDER — FENTANYL 12 UG/H
50 PATCH, EXTENDED RELEASE TRANSDERMAL
Refills: 0 | Status: DISCONTINUED | OUTPATIENT
Start: 2024-12-12 | End: 2024-12-12

## 2024-12-12 RX ORDER — GABAPENTIN 300 MG/1
600 CAPSULE ORAL ONCE
Refills: 0 | Status: COMPLETED | OUTPATIENT
Start: 2024-12-12 | End: 2024-12-12

## 2024-12-12 RX ORDER — LIDOCAINE HCL 20 MG/ML
0.2 VIAL (ML) INJECTION ONCE
Refills: 0 | Status: DISCONTINUED | OUTPATIENT
Start: 2024-12-12 | End: 2024-12-12

## 2024-12-12 RX ORDER — ONDANSETRON HYDROCHLORIDE 4 MG/1
4 TABLET, FILM COATED ORAL ONCE
Refills: 0 | Status: DISCONTINUED | OUTPATIENT
Start: 2024-12-12 | End: 2024-12-12

## 2024-12-12 RX ORDER — OXYCODONE HYDROCHLORIDE 30 MG/1
1 TABLET ORAL
Qty: 3 | Refills: 0
Start: 2024-12-12

## 2024-12-12 RX ORDER — CELECOXIB 200 MG/1
400 CAPSULE ORAL ONCE
Refills: 0 | Status: COMPLETED | OUTPATIENT
Start: 2024-12-12 | End: 2024-12-12

## 2024-12-12 RX ORDER — FENTANYL 12 UG/H
25 PATCH, EXTENDED RELEASE TRANSDERMAL
Refills: 0 | Status: DISCONTINUED | OUTPATIENT
Start: 2024-12-12 | End: 2024-12-12

## 2024-12-12 RX ORDER — HYDROMORPHONE HYDROCHLORIDE 2 MG/1
0.25 TABLET ORAL
Refills: 0 | Status: DISCONTINUED | OUTPATIENT
Start: 2024-12-12 | End: 2024-12-12

## 2024-12-12 RX ORDER — OXYCODONE HYDROCHLORIDE 30 MG/1
1 TABLET ORAL
Qty: 5 | Refills: 0
Start: 2024-12-12

## 2024-12-12 RX ORDER — ACETAMINOPHEN 500MG 500 MG/1
1000 TABLET, COATED ORAL ONCE
Refills: 0 | Status: COMPLETED | OUTPATIENT
Start: 2024-12-12 | End: 2024-12-12

## 2024-12-12 RX ORDER — CEFOTETAN AND DEXTROSE 1 G/50ML
2 INJECTION, SOLUTION INTRAVENOUS ONCE
Refills: 0 | Status: ACTIVE | OUTPATIENT
Start: 2024-12-12 | End: 2024-12-12

## 2024-12-12 RX ORDER — 0.9 % SODIUM CHLORIDE 0.9 %
1000 INTRAVENOUS SOLUTION INTRAVENOUS
Refills: 0 | Status: DISCONTINUED | OUTPATIENT
Start: 2024-12-12 | End: 2024-12-12

## 2024-12-12 RX ADMIN — GABAPENTIN 600 MILLIGRAM(S): 300 CAPSULE ORAL at 08:54

## 2024-12-12 RX ADMIN — CELECOXIB 400 MILLIGRAM(S): 200 CAPSULE ORAL at 09:07

## 2024-12-12 RX ADMIN — FENTANYL 50 MICROGRAM(S): 12 PATCH, EXTENDED RELEASE TRANSDERMAL at 12:01

## 2024-12-12 RX ADMIN — ACETAMINOPHEN 500MG 1000 MILLIGRAM(S): 500 TABLET, COATED ORAL at 08:54

## 2024-12-12 RX ADMIN — CELECOXIB 400 MILLIGRAM(S): 200 CAPSULE ORAL at 08:54

## 2024-12-12 RX ADMIN — ACETAMINOPHEN 500MG 1000 MILLIGRAM(S): 500 TABLET, COATED ORAL at 09:07

## 2024-12-12 NOTE — ASU PREOP CHECKLIST - DENTURES
no Implemented All Fall with Harm Risk Interventions:  [place] to call system. Call bell, personal items and telephone within reach. Instruct patient to call for assistance. Room bathroom lighting operational. Non-slip footwear when patient is off stretcher. Physically safe environment: no spills, clutter or unnecessary equipment. Stretcher in lowest position, wheels locked, appropriate side rails in place. Provide visual cue, wrist band, yellow gown, etc. Monitor gait and stability. Monitor for mental status changes and reorient to person, place, and time. Review medications for side effects contributing to fall risk. Reinforce activity limits and safety measures with patient and family. Provide visual clues: red socks.

## 2024-12-12 NOTE — PRE-ANESTHESIA EVALUATION ADULT - NSANTHPMHFT_GEN_ALL_CORE
49 year old female  with PMH of HTN, hydrosalpinx /ER visit 2024 for abdominal pain planned for ERP, Laparoscopic right salpingo oophorectomy, laparoscopic left salpingectomy on 2024 w/ Dr. Long.

## 2024-12-12 NOTE — BRIEF OPERATIVE NOTE - NSICDXBRIEFPROCEDURE_GEN_ALL_CORE_FT
PROCEDURES:  Laparoscopic salpingo-oophorectomy, right 12-Dec-2024 11:18:04  Leonela Jurado  Laparoscopic left salpingectomy 12-Dec-2024 11:18:18  Leonela Jurado  Right ureterolysis 12-Dec-2024 11:18:38  Leonela Jurado

## 2024-12-12 NOTE — PROGRESS NOTE ADULT - SUBJECTIVE AND OBJECTIVE BOX
GYN POST-OP CHECK    S: Patient seen and evaluated at bedside.  Pt awake and alert resting comfortably in bedside chair.  Patient reports pain controlled with analgesia. Pt denies N/V, SOB, CP, palpitations, fever/chills. Tolerating PO. (+) OOB (+) voiding    O:   T(C): 36.6 (12-12-24 @ 14:00), Max: 36.6 (12-12-24 @ 14:00)  HR: 50 (12-12-24 @ 13:45) (49 - 60)  BP: 126/67 (12-12-24 @ 13:45) (107/57 - 127/63)  RR: 16 (12-12-24 @ 13:45) (14 - 16)  SpO2: 97% (12-12-24 @ 13:45) (94% - 100%)  Wt(kg): --  I&O's Summary    12 Dec 2024 07:01  -  12 Dec 2024 13:56  --------------------------------------------------------  IN: 0 mL / OUT: 250 mL / NET: -250 mL        Gen: Resting comfortably in bed, NAD  CV: S1S2, RRR  Lungs: CTA B/L  Abd: soft, appropriately tender, occasional BS x 4 quadrants.    Inc: Clean/dry/intact w/ bandage in place- umbilical  Ext: Soft, non-tender b/l

## 2024-12-12 NOTE — ASU DISCHARGE PLAN (ADULT/PEDIATRIC) - PAIN MANAGEMENT
Take over the counter pain medication/Prescriptions electronically submitted to pharmacy from [facility]
SOB (shortness of breath)

## 2024-12-12 NOTE — ASU DISCHARGE PLAN (ADULT/PEDIATRIC) - NS MD DC FALL RISK RISK
For information on Fall & Injury Prevention, visit: [email or web address] OR  [email or web address] OR  https://www.cdc.gov/steadi/patient.html

## 2024-12-12 NOTE — PROGRESS NOTE ADULT - ASSESSMENT
A/P: 49y Female s/p LSC RSO, LS    Neuro: PO Analgesia PRN    CV: Hemodynamically stable  Pulm: Saturating well on room air.  Encourage OOB  GI: Advance to regular diet. Anti-emetics PRN  : Voiding  FEN: Electrolytes: LR@125cc/hr  Heme: DVT ppx w/ SCD's while in bed. Early ambulation, initially with assistance then as tolerated.    ID: Afebrile  Endo: No active issues   Dispo: Discharge to home from PACU

## 2024-12-12 NOTE — BRIEF OPERATIVE NOTE - OPERATION/FINDINGS
EUA uterus small mobile, palpable 2 cm posterior cul de sac fibroid. On laparoscopy left ovary with 2cm simple-appearing cyst, left fallopian tube grossly normal. Right fallopian tube dilated and filled with blood. Cecum with filmy adhesions to right fimbriae. Right ovary adherent to right pelvic side wall, atrophic. Right ureter seen vermiculating retroperitoneally. Uterus grossly normal with 2cm posterior pedunculated fibroid. Small bowel, omentum, liver edge grossly normal.

## 2024-12-12 NOTE — ASU DISCHARGE PLAN (ADULT/PEDIATRIC) - FINANCIAL ASSISTANCE
[facility] provides services at a reduced cost to those who are determined to be eligible through [facility]’s financial assistance program. Information regarding [facility]’s financial assistance program can be found by going to [email or web address] or by calling [phone].

## 2024-12-12 NOTE — ASU DISCHARGE PLAN (ADULT/PEDIATRIC) - CARE PROVIDER_API CALL
Roni Long  Obstetrics and Gynecology  [address] [place] [address], [facility] [address]  [place] [address]  Phone: [phone]  Fax: [phone]  Follow Up Time:

## 2024-12-12 NOTE — ASU DISCHARGE PLAN (ADULT/PEDIATRIC) - NURSING INSTRUCTIONS
******************************************************************************************  Next dose of TYLENOL may be taken at or after 4PM if needed. DO NOT take any additional products containing TYLENOL or ACETAMINOPHEN, such as VICODIN, PERCOCET, NORCO, EXCEDRIN, and any over-the-counter cold medications until this time. DO NOT CONSUME MORE THAN 9546-5175 MG of TYLENOL (acetaminophen) in a 24-hour period.    Next dose of Motrin can be taken at or after 4PM.

## 2024-12-16 LAB — NON-GYNECOLOGICAL CYTOLOGY STUDY: SIGNIFICANT CHANGE UP

## 2024-12-19 LAB — SURGICAL PATHOLOGY STUDY: SIGNIFICANT CHANGE UP

## 2024-12-25 PROBLEM — F10.90 ALCOHOL USE: Status: ACTIVE | Noted: 2023-06-26

## 2025-01-03 ENCOUNTER — APPOINTMENT (OUTPATIENT)
Dept: OBGYN | Facility: CLINIC | Age: 50
End: 2025-01-03
Payer: COMMERCIAL

## 2025-01-03 VITALS
DIASTOLIC BLOOD PRESSURE: 62 MMHG | HEIGHT: 61 IN | HEART RATE: 54 BPM | SYSTOLIC BLOOD PRESSURE: 148 MMHG | WEIGHT: 139 LBS | BODY MASS INDEX: 26.24 KG/M2

## 2025-01-03 PROBLEM — U07.1 COVID-19: Chronic | Status: ACTIVE | Noted: 2024-12-03

## 2025-01-03 PROBLEM — D25.9 LEIOMYOMA OF UTERUS, UNSPECIFIED: Chronic | Status: ACTIVE | Noted: 2024-12-03

## 2025-01-03 PROBLEM — K42.9 UMBILICAL HERNIA WITHOUT OBSTRUCTION OR GANGRENE: Chronic | Status: ACTIVE | Noted: 2024-12-03

## 2025-01-03 PROCEDURE — 99024 POSTOP FOLLOW-UP VISIT: CPT

## 2025-01-09 PROBLEM — N70.11 CHRONIC SALPINGITIS: Chronic | Status: ACTIVE | Noted: 2024-12-03

## 2025-01-14 ENCOUNTER — RESULT REVIEW (OUTPATIENT)
Age: 50
End: 2025-01-14

## 2025-01-14 ENCOUNTER — OUTPATIENT (OUTPATIENT)
Dept: OUTPATIENT SERVICES | Facility: HOSPITAL | Age: 50
LOS: 1 days | End: 2025-01-14
Payer: COMMERCIAL

## 2025-01-14 ENCOUNTER — APPOINTMENT (OUTPATIENT)
Dept: ULTRASOUND IMAGING | Facility: CLINIC | Age: 50
End: 2025-01-14

## 2025-01-14 ENCOUNTER — APPOINTMENT (OUTPATIENT)
Dept: MAMMOGRAPHY | Facility: CLINIC | Age: 50
End: 2025-01-14

## 2025-01-14 DIAGNOSIS — Z12.39 ENCOUNTER FOR OTHER SCREENING FOR MALIGNANT NEOPLASM OF BREAST: ICD-10-CM

## 2025-01-14 DIAGNOSIS — Z12.31 ENCOUNTER FOR SCREENING MAMMOGRAM FOR MALIGNANT NEOPLASM OF BREAST: ICD-10-CM

## 2025-01-14 DIAGNOSIS — Z90.49 ACQUIRED ABSENCE OF OTHER SPECIFIED PARTS OF DIGESTIVE TRACT: Chronic | ICD-10-CM

## 2025-01-14 PROCEDURE — 76641 ULTRASOUND BREAST COMPLETE: CPT

## 2025-01-14 PROCEDURE — 76641 ULTRASOUND BREAST COMPLETE: CPT | Mod: 26,50

## 2025-01-14 PROCEDURE — 77067 SCR MAMMO BI INCL CAD: CPT

## 2025-01-14 PROCEDURE — 77067 SCR MAMMO BI INCL CAD: CPT | Mod: 26

## 2025-01-14 PROCEDURE — 77063 BREAST TOMOSYNTHESIS BI: CPT | Mod: 26

## 2025-01-14 PROCEDURE — 77063 BREAST TOMOSYNTHESIS BI: CPT

## 2025-02-10 ENCOUNTER — APPOINTMENT (OUTPATIENT)
Dept: OBGYN | Facility: CLINIC | Age: 50
End: 2025-02-10
Payer: COMMERCIAL

## 2025-02-10 VITALS
BODY MASS INDEX: 26.43 KG/M2 | WEIGHT: 140 LBS | SYSTOLIC BLOOD PRESSURE: 164 MMHG | HEIGHT: 61 IN | DIASTOLIC BLOOD PRESSURE: 90 MMHG

## 2025-02-10 DIAGNOSIS — N39.0 URINARY TRACT INFECTION, SITE NOT SPECIFIED: ICD-10-CM

## 2025-02-10 PROCEDURE — 99213 OFFICE O/P EST LOW 20 MIN: CPT

## 2025-02-10 RX ORDER — FLUCONAZOLE 150 MG/1
150 TABLET ORAL
Qty: 10 | Refills: 3 | Status: ACTIVE | COMMUNITY
Start: 2025-02-10 | End: 1900-01-01

## 2025-02-10 RX ORDER — SULFAMETHOXAZOLE AND TRIMETHOPRIM 800; 160 MG/1; MG/1
800-160 TABLET ORAL TWICE DAILY
Qty: 10 | Refills: 1 | Status: ACTIVE | COMMUNITY
Start: 2025-02-10 | End: 1900-01-01

## 2025-02-11 LAB — BACTERIA UR CULT: NORMAL

## 2025-02-13 ENCOUNTER — APPOINTMENT (OUTPATIENT)
Dept: OBGYN | Facility: CLINIC | Age: 50
End: 2025-02-13

## 2025-02-13 VITALS — HEIGHT: 61 IN | WEIGHT: 136 LBS | BODY MASS INDEX: 25.68 KG/M2

## 2025-02-13 DIAGNOSIS — L29.2 PRURITUS VULVAE: ICD-10-CM

## 2025-02-13 DIAGNOSIS — R30.0 DYSURIA: ICD-10-CM

## 2025-02-13 DIAGNOSIS — Z11.3 ENCOUNTER FOR SCREENING FOR INFECTIONS WITH A PREDOMINANTLY SEXUAL MODE OF TRANSMISSION: ICD-10-CM

## 2025-02-13 LAB
BILIRUB UR QL STRIP: NORMAL
GLUCOSE UR-MCNC: NORMAL
HCG UR QL: 0.2 EU/DL
HGB UR QL STRIP.AUTO: NORMAL
KETONES UR-MCNC: NORMAL
LEUKOCYTE ESTERASE UR QL STRIP: NORMAL
NITRITE UR QL STRIP: NORMAL
PH UR STRIP: 5.5
PROT UR STRIP-MCNC: NORMAL
SP GR UR STRIP: >=1.03

## 2025-02-13 PROCEDURE — 36415 COLL VENOUS BLD VENIPUNCTURE: CPT

## 2025-02-13 PROCEDURE — 99213 OFFICE O/P EST LOW 20 MIN: CPT | Mod: 25

## 2025-02-13 PROCEDURE — 99459 PELVIC EXAMINATION: CPT

## 2025-02-13 PROCEDURE — 81002 URINALYSIS NONAUTO W/O SCOPE: CPT

## 2025-02-13 RX ORDER — TERCONAZOLE 8 MG/G
0.8 CREAM VAGINAL DAILY
Qty: 1 | Refills: 1 | Status: ACTIVE | COMMUNITY
Start: 2025-02-13 | End: 1900-01-01

## 2025-02-17 LAB
BACTERIA UR CULT: NORMAL
C TRACH RRNA SPEC QL NAA+PROBE: NOT DETECTED
CANDIDA VAG CYTO: NOT DETECTED
G VAGINALIS+PREV SP MTYP VAG QL MICRO: DETECTED
HBV SURFACE AG SER QL: NONREACTIVE
HCV AB SER QL: NONREACTIVE
HCV S/CO RATIO: 0.07 S/CO
HIV1+2 AB SPEC QL IA.RAPID: NONREACTIVE
N GONORRHOEA RRNA SPEC QL NAA+PROBE: NOT DETECTED
SOURCE AMPLIFICATION: NORMAL
T VAGINALIS VAG QL WET PREP: NOT DETECTED

## 2025-02-17 RX ORDER — METRONIDAZOLE 7.5 MG/G
0.75 GEL VAGINAL
Qty: 1 | Refills: 0 | Status: ACTIVE | COMMUNITY
Start: 2025-02-17 | End: 1900-01-01

## 2025-02-20 LAB — T PALLIDUM AB SER QL IA: NEGATIVE

## 2025-05-15 NOTE — ED ADULT TRIAGE NOTE - MEANS OF ARRIVAL
ambulatory
Pt received in bed in NAD, +merino, +HMVx1, +spouse bedside, +IVL, VSS, agreeable to participate in therapy at this time

## (undated) DEVICE — PREP BETADINE KIT

## (undated) DEVICE — TUBING STRYKEFLOW II SUCTION / IRRIGATOR

## (undated) DEVICE — MEDICATION LABELS W MARKER

## (undated) DEVICE — POLY TRAP ETRAP

## (undated) DEVICE — DRSG STERISTRIPS 0.5 X 4"

## (undated) DEVICE — BLADE SCALPEL SAFETYLOCK #15

## (undated) DEVICE — TROCAR COVIDIEN BLUNT TIP HASSAN 10MM

## (undated) DEVICE — SOL IRR POUR NS 0.9% 500ML

## (undated) DEVICE — TROCAR APPLIED MEDICAL KII BALLOON BLUNT TIP 12MM X 100MM

## (undated) DEVICE — SOL IRR POUR NS 0.9% 1000ML

## (undated) DEVICE — SUCTION YANKAUER NO CONTROL VENT

## (undated) DEVICE — FORCEP RADIAL JAW 4 JUMBO 2.8MM 3.2MM 240CM ORANGE DISP

## (undated) DEVICE — TUBING SUCTION 20FT

## (undated) DEVICE — ENDOCATCH 10MM

## (undated) DEVICE — GOWN TRIMAX LG

## (undated) DEVICE — DRAPE LIGHT HANDLE COVER (BLUE)

## (undated) DEVICE — BRUSH COLONOSCOPY CYTOLOGY

## (undated) DEVICE — IRRIGATOR BIO SHIELD

## (undated) DEVICE — PACK IV START WITH CHG

## (undated) DEVICE — UTERINE MANIPULATOR CLINICAL INNOVATIONS CLEARVIEW 7CM

## (undated) DEVICE — SPECIMEN CONTAINER 100ML

## (undated) DEVICE — POSITIONER FOAM EGG CRATE ULNAR 2PCS (PINK)

## (undated) DEVICE — WARMING BLANKET UPPER ADULT

## (undated) DEVICE — VALVE YELLOW PORT SEAL PLUS 5MM

## (undated) DEVICE — TROCAR COVIDIEN VERSASTEP PLUS 11MM STANDARD

## (undated) DEVICE — COLONOSCOPE 2416901: Type: DURABLE MEDICAL EQUIPMENT

## (undated) DEVICE — DRAPE TOWEL BLUE 17" X 24"

## (undated) DEVICE — GLV 7.5 PROTEXIS (WHITE)

## (undated) DEVICE — SYR LUER LOK 50CC

## (undated) DEVICE — POSITIONER PINK PAD PIGAZZI SYSTEM

## (undated) DEVICE — TROCAR GELPOINT MINI ADVANCED

## (undated) DEVICE — GLV 6.5 PROTEXIS (WHITE)

## (undated) DEVICE — SOL IRR POUR H2O 250ML

## (undated) DEVICE — FOLEY HOLDER STATLOCK 2 WAY ADULT

## (undated) DEVICE — CATH IV SAFE BC 20G X 1.16" (PINK)

## (undated) DEVICE — DRAPE INSTRUMENT POUCH 6.75" X 11"

## (undated) DEVICE — SOL INJ NS 0.9% 500ML 2 PORT

## (undated) DEVICE — UTERINE MANIPULATOR COOPER SURGICAL 5MM 33CM GREEN

## (undated) DEVICE — RUMI TIP BLUE 6.7MM X 8CM

## (undated) DEVICE — Device

## (undated) DEVICE — BLADE SCALPEL SAFETYLOCK #10

## (undated) DEVICE — CATH IV SAFE BC 22G X 1" (BLUE)

## (undated) DEVICE — TROCAR COVIDIEN VERSAONE BLADED FIXATION 11MM STANDARD

## (undated) DEVICE — INSUFFLATION NDL COVIDIEN STEP 14G FOR STEP/VERSASTEP

## (undated) DEVICE — PACK GYN LAPAROSCOPY

## (undated) DEVICE — TUBING SUCTION CONN 6FT STERILE

## (undated) DEVICE — SENSOR O2 FINGER ADULT

## (undated) DEVICE — TUBING INSUFFLATION LAP FILTER 10FT

## (undated) DEVICE — DRAPE 3/4 SHEET W REINFORCEMENT 56X77"

## (undated) DEVICE — POSITIONER PURPLE ARM ONE STEP (LARGE)

## (undated) DEVICE — LIGASURE BLUNT TIP 5MM X 37CM

## (undated) DEVICE — VENODYNE/SCD SLEEVE CALF MEDIUM

## (undated) DEVICE — CLAMP BX HOT RAD JAW 3

## (undated) DEVICE — GLV 7 PROTEXIS (WHITE)

## (undated) DEVICE — DRAPE MAYO STAND 30"

## (undated) DEVICE — PREP CHLORAPREP HI-LITE ORANGE 26ML

## (undated) DEVICE — BIOPSY FORCEP RADIAL JAW 4 STANDARD WITH NEEDLE

## (undated) DEVICE — TUBING IV SET GRAVITY 3Y 100" MACRO

## (undated) DEVICE — ELCTR GROUNDING PAD ADULT COVIDIEN